# Patient Record
Sex: MALE | Race: WHITE | NOT HISPANIC OR LATINO | Employment: FULL TIME | URBAN - METROPOLITAN AREA
[De-identification: names, ages, dates, MRNs, and addresses within clinical notes are randomized per-mention and may not be internally consistent; named-entity substitution may affect disease eponyms.]

---

## 2022-08-31 ENCOUNTER — RA CDI HCC (OUTPATIENT)
Dept: OTHER | Facility: HOSPITAL | Age: 27
End: 2022-08-31

## 2022-08-31 NOTE — PROGRESS NOTES
[facility]  coding opportunities       Chart reviewed, no opportunity found: CHART REVIEWED, NO OPPORTUNITY FOUND        Patients Insurance        Commercial Insurance: [address] [place] [address]

## 2022-09-06 NOTE — PROGRESS NOTES
ADULT ANNUAL [address] IN PARTNERSHIP WITH [facility]'S    NAME: Rk Hanley  AGE: 32 y o  SEX: male  : 1995     DATE: 2022     Assessment and Plan:     - baseline labs ordered to complete fasting    - mental health is primary concern today  Patient has a past medical history of anxiety and depression, and the patient also believes he has underlying ADHD that has been present since childhood  Screening tools are suggestive of this diagnosis but it is not confirmed yet  Will address further at follow-up appointment in 3 weeks  Clinical pharmacy team consulted, would appreciate their professional opinion in addition to possible treatment options based on the patient's condition    - patient advised to slowly decrease caffeine intake which is likely contributing to his mental health symptoms  Also encouraged the patient to continue his outpatient virtual therapy sessions    - Discuss treatment options at f/u (stimulants vs SSRIs)    Problem List Items Addressed This Visit        Other    Overweight (BMI 25 0-29  9)    Relevant Orders    CBC and differential    Comprehensive metabolic panel    Lipid panel    Vitamin D 25 hydroxy    TSH, 3rd generation with Free T4 reflex    Anxiety and depression     First noticed issues back in , was coping with alcohol, no longer consuming in excess  Family hx of alcohol abuse  Currently doing telehealth therapy, has concerns he may have not been dx with ADHD as a child  Primary issue is with "focus and tasks" and "mind going in all directions"  Noticed problems in early adolescents  Positive ASRS-v 1 checklist with therapy team (adhd screen)  Brief version performed today which was positive  Currently consuming approx 600mg of caffeine daily (energy drinks)  Is concerned this is contributing  Was on an anti-anxiety medication in past, no longer taking   Thinks it was a benzodiazepine  States "it was a bandaid treatment"  Relevant Orders    Vitamin D 25 hydroxy    TSH, 3rd generation with Free T4 reflex    Ambulatory referral to clinical pharmacy      Other Visit Diagnoses     Annual physical exam    -  Primary    Relevant Orders    CBC and differential    Comprehensive metabolic panel    Lipid panel    Need for hepatitis C screening test        Relevant Orders    Hepatitis C Ab W/Refl To HCV RNA, Qn, PCR    Screening for HIV (human immunodeficiency virus)        Relevant Orders    Human Immunodeficiency Virus 1/2 Antigen / Antibody ( Fourth Generation) with Reflex Testing          Immunizations and preventive care screenings were discussed with patient today  Appropriate education was printed on patient's after visit summary  Counseling:  Alcohol/drug use: discussed moderation in alcohol intake, the recommendations for healthy alcohol use, and avoidance of illicit drug use  BMI Counseling: Body mass index is 27 76 kg/m²  The BMI is above normal  Nutrition recommendations include limiting drinks that contain sugar  Exercise recommendations include exercising 3-5 times per week  Rationale for BMI follow-up plan is due to patient being overweight or obese  Return in about 3 weeks (around 9/29/2022) for Recheck  Chief Complaint:     Chief Complaint   Patient presents with    Physical Exam    ADHD     Impulsive behavior, losing focus in conversation       History of Present Illness:     Adult Annual Physical   Patient here for a comprehensive physical exam  The patient reports problems - anxiety/depression  Diet and Physical Activity  Diet/Nutrition: high protein  High caffeine intake  Exercise: 5-7 times a week on average         Depression Screening  PHQ-2/9 Depression Screening    Little interest or pleasure in doing things: 1 - several days  Feeling down, depressed, or hopeless: 1 - several days  PHQ-2 Score: 2  PHQ-2 Interpretation: Negative depression screen       General Health  Sleep: gets 4-6 hours of sleep on average  Hearing: normal - bilateral   Vision: wears glasses  Dental: regular dental visits   Health  History of STDs?: no      Review of Systems:     Review of Systems   Constitutional: Negative  HENT: Negative  Eyes: Negative  Respiratory: Negative  Cardiovascular: Negative  Gastrointestinal: Negative  Endocrine: Negative  Genitourinary: Negative  Musculoskeletal: Negative  Skin: Negative  Allergic/Immunologic: Negative  Neurological: Negative  Hematological: Negative  Psychiatric/Behavioral: The patient is nervous/anxious and is hyperactive  Past Medical History:     Past Medical History:   Diagnosis Date    Allergic     Anxiety 2016    Depression 2016      Past Surgical History:     History reviewed  No pertinent surgical history  Social History:     Social History     Socioeconomic History    Marital status: Single     Spouse name: None    Number of children: None    Years of education: None    Highest education level: None   Occupational History    None   Tobacco Use    Smoking status: Passive Smoke Exposure - Never Smoker    Smokeless tobacco: Never Used   Substance and Sexual Activity    Alcohol use:  Yes     Alcohol/week: 8 0 standard drinks     Types: 8 Cans of beer per week    Drug use: Never    Sexual activity: Not Currently     Partners: Female     Birth control/protection: Condom Male   Other Topics Concern    None   Social History Narrative    None     Social Determinants of Health     Financial Resource Strain: Not on file   Food Insecurity: Not on file   Transportation Needs: Not on file   Physical Activity: Not on file   Stress: Not on file   Social Connections: Not on file   Intimate Partner Violence: Not on file   Housing Stability: Not on file      Family History:     Family History   Problem Relation Age of Onset    Depression Mother    Angy Wilkinson Vision loss Mother     Alcohol abuse Father     Prostate cancer Father     Hearing loss Father     Alcohol abuse Paternal Uncle     Depression Paternal Uncle     Mental illness Cousin         Death by Suicide    Completed Suicide  Cousin     Depression Maternal Aunt       Current Medications:     No current outpatient medications on file  No current facility-administered medications for this visit  Allergies: Allergies   Allergen Reactions    Nuts - Food Allergy Anaphylaxis and Hives      Physical Exam:     /72 (BP Location: Left arm, Patient Position: Sitting, Cuff Size: Large)   Pulse 80   Temp 97 8 °F (36 6 °C)   Ht 5' 6" (1 676 m)   Wt 78 kg (172 lb)   SpO2 98%   BMI 27 76 kg/m²     Physical Exam  Vitals and nursing note reviewed  Constitutional:       Appearance: Normal appearance  He is well-developed  HENT:      Head: Normocephalic and atraumatic  Right Ear: Tympanic membrane normal  There is no impacted cerumen  Left Ear: Tympanic membrane normal  There is no impacted cerumen  Nose: Nose normal  No congestion  Mouth/Throat:      Mouth: Mucous membranes are moist       Pharynx: Oropharynx is clear  No oropharyngeal exudate or posterior oropharyngeal erythema  Eyes:      Extraocular Movements: Extraocular movements intact  Conjunctiva/sclera: Conjunctivae normal       Pupils: Pupils are equal, round, and reactive to light  Cardiovascular:      Rate and Rhythm: Normal rate and regular rhythm  Pulses: Normal pulses  Heart sounds: Normal heart sounds  No murmur heard  Pulmonary:      Effort: Pulmonary effort is normal  No respiratory distress  Breath sounds: Normal breath sounds  No wheezing  Abdominal:      General: Bowel sounds are normal       Palpations: Abdomen is soft  Tenderness: There is no abdominal tenderness  Musculoskeletal:         General: No swelling, tenderness, deformity or signs of injury   Normal range of motion  Cervical back: Normal range of motion and neck supple  Right lower leg: No edema  Left lower leg: No edema  Lymphadenopathy:      Cervical: No cervical adenopathy  Skin:     General: Skin is warm and dry  Capillary Refill: Capillary refill takes less than 2 seconds  Findings: No rash  Neurological:      General: No focal deficit present  Mental Status: He is alert and oriented to person, place, and time  Mental status is at baseline  Cranial Nerves: No cranial nerve deficit  Sensory: No sensory deficit  Motor: No weakness        Coordination: Coordination normal       Gait: Gait normal       Deep Tendon Reflexes: Reflexes normal    Psychiatric:         Mood and Affect: Mood normal          Behavior: Behavior normal           Marylin Lucero

## 2022-09-06 NOTE — PATIENT INSTRUCTIONS
Try to decrease caffeine intake  Complete bloodwork fasting here or at [facility]  Continue virtual therapy sessions  Pharmacy team will reach out to schedule a visit  Wellness Visit for Adults   AMBULATORY CARE:   A wellness visit  is when you see your healthcare provider to get screened for health problems  Your healthcare provider will also give you advice on how to stay healthy  Write down your questions so you remember to ask them  Ask your healthcare provider how often you should have a wellness visit  What happens at a wellness visit:  Your healthcare provider will ask about your health, and your family history of health problems  This includes high blood pressure, heart disease, and cancer  He or she will ask if you have symptoms that concern you, if you smoke, and about your mood  You may also be asked about your intake of medicines, supplements, food, and alcohol  Any of the following may be done: Your weight  will be checked  Your height may also be checked so your body mass index (BMI) can be calculated  Your BMI shows if you are at a healthy weight  Your blood pressure  and heart rate will be checked  Your temperature may also be checked  Blood and urine tests  may be done  Blood tests may be done to check your cholesterol levels  Abnormal cholesterol levels increase your risk for heart disease and stroke  You may also need a blood or urine test to check for diabetes if you are at increased risk  Urine tests may be done to look for signs of an infection or kidney disease  A physical exam  includes checking your heartbeat and lungs with a stethoscope  Your healthcare provider may also check your skin to look for sun damage  Screening tests  may be recommended  A screening test is done to check for diseases that may not cause symptoms  The screening tests you may need depend on your age, gender, family history, and lifestyle habits   For example, colorectal screening may be recommended if you are 48years old or older  Screening tests you need if you are a woman:   A Pap smear  is used to screen for cervical cancer  Pap smears are usually done every 3 to 5 years depending on your age  You may need them more often if you have had abnormal Pap smear test results in the past  Ask your healthcare provider how often you should have a Pap smear  A mammogram  is an x-ray of your breasts to screen for breast cancer  Experts recommend mammograms every 2 years starting at age 48 years  You may need a mammogram at age 52 years or younger if you have an increased risk for breast cancer  Talk to your healthcare provider about when you should start having mammograms and how often you need them  Vaccines you may need:   Get an influenza vaccine  every year  The influenza vaccine protects you from the flu  Several types of viruses cause the flu  The viruses change over time, so new vaccines are made each year  Get a tetanus-diphtheria (Td) booster vaccine  every 10 years  This vaccine protects you against tetanus and diphtheria  Tetanus is a severe infection that may cause painful muscle spasms and lockjaw  Diphtheria is a severe bacterial infection that causes a thick covering in the back of your mouth and throat  Get a human papillomavirus (HPV) vaccine  if you are female and aged 23 to 32 or male 23 to 24 and never received it  This vaccine protects you from HPV infection  HPV is the most common infection spread by sexual contact  HPV may also cause vaginal, penile, and anal cancers  Get a pneumococcal vaccine  if you are aged 72 years or older  The pneumococcal vaccine is an injection given to protect you from pneumococcal disease  Pneumococcal disease is an infection caused by pneumococcal bacteria  The infection may cause pneumonia, meningitis, or an ear infection  Get a shingles vaccine  if you are 60 or older, even if you have had shingles before   The shingles vaccine is an injection to protect you from the varicella-zoster virus  This is the same virus that causes chickenpox  Shingles is a painful rash that develops in people who had chickenpox or have been exposed to the virus  How to eat healthy:  My Plate is a model for planning healthy meals  It shows the types and amounts of foods that should go on your plate  Fruits and vegetables make up about half of your plate, and grains and protein make up the other half  A serving of dairy is included on the side of your plate  The amount of calories and serving sizes you need depends on your age, gender, weight, and height  Examples of healthy foods are listed below:  Eat a variety of vegetables  such as dark green, red, and orange vegetables  You can also include canned vegetables low in sodium (salt) and frozen vegetables without added butter or sauces  Eat a variety of fresh fruits , canned fruit in 100% juice, frozen fruit, and dried fruit  Include whole grains  At least half of the grains you eat should be whole grains  Examples include whole-wheat bread, wheat pasta, brown rice, and whole-grain cereals such as oatmeal     Eat a variety of protein foods such as seafood (fish and shellfish), lean meat, and poultry without skin (turkey and chicken)  Examples of lean meats include pork leg, shoulder, or tenderloin, and beef round, sirloin, tenderloin, and extra lean ground beef  Other protein foods include eggs and egg substitutes, beans, peas, soy products, nuts, and seeds  Choose low-fat dairy products such as skim or 1% milk or low-fat yogurt, cheese, and cottage cheese  Limit unhealthy fats  such as butter, hard margarine, and shortening  Exercise:  Exercise at least 30 minutes per day on most days of the week  Some examples of exercise include walking, biking, dancing, and swimming  You can also fit in more physical activity by taking the stairs instead of the elevator or parking farther away from stores  Include muscle strengthening activities 2 days each week  Regular exercise provides many health benefits  It helps you manage your weight, and decreases your risk for type 2 diabetes, heart disease, stroke, and high blood pressure  Exercise can also help improve your mood  Ask your healthcare provider about the best exercise plan for you  General health and safety guidelines:   Do not smoke  Nicotine and other chemicals in cigarettes and cigars can cause lung damage  Ask your healthcare provider for information if you currently smoke and need help to quit  E-cigarettes or smokeless tobacco still contain nicotine  Talk to your healthcare provider before you use these products  Limit alcohol  A drink of alcohol is 12 ounces of beer, 5 ounces of wine, or 1½ ounces of liquor  Lose weight, if needed  Being overweight increases your risk of certain health conditions  These include heart disease, high blood pressure, type 2 diabetes, and certain types of cancer  Protect your skin  Do not sunbathe or use tanning beds  Use sunscreen with a SPF 15 or higher  Apply sunscreen at least 15 minutes before you go outside  Reapply sunscreen every 2 hours  Wear protective clothing, hats, and sunglasses when you are outside  Drive safely  Always wear your seatbelt  Make sure everyone in your car wears a seatbelt  A seatbelt can save your life if you are in an accident  Do not use your cell phone when you are driving  This could distract you and cause an accident  Pull over if you need to make a call or send a text message  Practice safe sex  Use latex condoms if are sexually active and have more than one partner  Your healthcare provider may recommend screening tests for sexually transmitted infections (STIs)  Wear helmets, lifejackets, and protective gear  Always wear a helmet when you ride a bike or motorcycle, go skiing, or play sports that could cause a head injury   Wear protective equipment when you play sports  Wear a lifejacket when you are on a boat or doing water sports  © Copyright [address] 2022 Information is for End User's use only and may not be sold, redistributed or otherwise used for commercial purposes  All illustrations and images included in CareNotes® are the copyrighted property of [employer]  or [facility] Jonnathan Figueroa   The above information is an  only  It is not intended as medical advice for individual conditions or treatments  Talk to your doctor, nurse or pharmacist before following any medical regimen to see if it is safe and effective for you

## 2022-09-08 ENCOUNTER — OFFICE VISIT (OUTPATIENT)
Dept: FAMILY MEDICINE CLINIC | Facility: CLINIC | Age: 27
End: 2022-09-08
Payer: COMMERCIAL

## 2022-09-08 VITALS
HEART RATE: 80 BPM | HEIGHT: 66 IN | OXYGEN SATURATION: 98 % | TEMPERATURE: 97.8 F | BODY MASS INDEX: 27.64 KG/M2 | WEIGHT: 172 LBS | SYSTOLIC BLOOD PRESSURE: 120 MMHG | DIASTOLIC BLOOD PRESSURE: 72 MMHG

## 2022-09-08 DIAGNOSIS — Z00.00 ANNUAL PHYSICAL EXAM: Primary | ICD-10-CM

## 2022-09-08 DIAGNOSIS — Z11.4 SCREENING FOR HIV (HUMAN IMMUNODEFICIENCY VIRUS): ICD-10-CM

## 2022-09-08 DIAGNOSIS — F41.9 ANXIETY AND DEPRESSION: ICD-10-CM

## 2022-09-08 DIAGNOSIS — E66.3 OVERWEIGHT (BMI 25.0-29.9): ICD-10-CM

## 2022-09-08 DIAGNOSIS — F32.A ANXIETY AND DEPRESSION: ICD-10-CM

## 2022-09-08 DIAGNOSIS — Z11.59 NEED FOR HEPATITIS C SCREENING TEST: ICD-10-CM

## 2022-09-08 PROCEDURE — 3725F SCREEN DEPRESSION PERFORMED: CPT | Performed by: NURSE PRACTITIONER

## 2022-09-08 PROCEDURE — 99385 PREV VISIT NEW AGE 18-39: CPT | Performed by: NURSE PRACTITIONER

## 2022-09-08 NOTE — ASSESSMENT & PLAN NOTE
First noticed issues back in 2016, was coping with alcohol, no longer consuming in excess  Family hx of alcohol abuse  Currently doing telehealth therapy, has concerns he may have not been dx with ADHD as a child  Primary issue is with "focus and tasks" and "mind going in all directions"  Noticed problems in early adolescents  Positive ASRS-v 1 checklist with therapy team (adhd screen)  Brief version performed today which was positive  Currently consuming approx 600mg of caffeine daily (energy drinks)  Is concerned this is contributing  Was on an anti-anxiety medication in past, no longer taking  Thinks it was a benzodiazepine  States "it was a bandaid treatment"

## 2022-09-15 ENCOUNTER — CLINICAL SUPPORT (OUTPATIENT)
Dept: FAMILY MEDICINE CLINIC | Facility: CLINIC | Age: 27
End: 2022-09-15

## 2022-09-15 DIAGNOSIS — F32.A ANXIETY AND DEPRESSION: ICD-10-CM

## 2022-09-15 DIAGNOSIS — F41.9 ANXIETY AND DEPRESSION: ICD-10-CM

## 2022-09-15 NOTE — PROGRESS NOTES
[address]    Moi Brand is a 32 y o  male who was referred to the pharmacist for medication education and management, referred by JOAN Encinas   Telemedicine consent  The patient was identified by name and date of birth  Moi Brand was informed that this is a telemedicine visit and that the visit is being conducted through Telephone  My office door was closed  No one else was in the room  He acknowledged consent and understanding of privacy and security of the video platform  The patient has agreed to participate and understands they can discontinue the visit at any time  Assessment/ Plan       1  Mental Health  · Concern for underlying ADHD without confirmed diagnosis plus depression/anxiety  · First line- consider non-stimulant type medication such as bupropion  Starting dose for ADHD is bupropion  mg daily in AM  After 3-4 weeks increase to therapeutic dose of bupropion  mg daily (max dose 450 mg)  · Bupropion should be tried for ~3-4 weeks at max tolerated therapeutic dose  · If bupropion not effective then may consider stimulant type medication  Discussed with patient that he would need to severely cut down on caffeine consumption before starting (additive effects of increased HR and BP)  Also discussed that if on stimulant medication he would need to avoid consumption of alcohol and limit to no more than 1-2 drinks per day  Changes to Medication Regimen:    No changes today  Patient will get updated blood work prior to next appt with PCP    2  Medication Reconciliation:    Not currently on prescription or OTC medication     Labs ordered: reminded patient to get prior to next appt with PCP    Follow-up: PCP 9/29/22    Subjective     1   Medication Adherence/ Tolerability:   Benzo for anxiety in past  Patient states it was "off-brand xanax" so likely alprazolam  Patient states it was more of a "bandaid"  Has not been on any other maintenance medications for depression / anxiety     2  History of mental health  · Patient recalls when he was 13 yo that was having focus issues that he brought it up with his parents  They said to make them aware if it was affecting his school work and he did not bring up again  Then pre-pandmic he once again began to notice issues with focusing and patterns of thinking that were scattered  · More recently he started going to therapy to discuss this with his therapist  Also states he had difficulties earlier with increased alcohol consumption, but this was part of a larger problem with impulse control  He also feels her is currently overstimulating himself with caffeine  Since therapy he has implemented behavioral changes and developed coping mechanisms  He is now focusing on lifestyle changes such as improved diet, eating more protein, and increasing exercise     2  Lifestyle/ social hx:   o Caffeine intake: Estimates that he was drinking about 600 mg of caffeine daily in the form of energy drinks(Bang 16 oz (300 mg caffeine) or Reign energy drink)  Previously he was drinking 2 cans of Bang most days (600 mg total)  He decreased it down to 1 can of Bang most days this past week (300 mg total)  Plans to continue to decrease consumption over the next few weeks  o Alcohol use: Reports that 2016 through 2019 he was drinking "way too much"  He did cut back on alcohol from 2019- 2020  Then in 2021 through 2022 he was attempting sobriety and was mostly successful  In 2022 reports he is no longer completely sober but still in control of his drinking  Admits to using it as a coping mechanism  Reports current consumption is 4-6 drinks in a day and this happens about 1x week     Physical Activity: Exercises 4-5 session in the gym ~1 hour resistance training    Sleep: attempting to get 8 hours per night        Objective       No current outpatient medications  Allergies   Allergen Reactions    Nuts - Food Allergy Anaphylaxis and Hives       Immunization History   Administered Date(s) Administered    Hep A, ped/adol, 2 dose 04/01/2009, 05/10/2013    Influenza, seasonal, injectable 10/10/2002, 09/29/2003, 11/02/2005    Meningococcal MCV4P 06/26/2007, 05/17/2011    Td (adult), adsorbed 06/10/2005    Tdap 04/01/2009    Tuberculin Skin Test-PPD Intradermal 05/11/2011, 07/07/2020       Vitals: There were no vitals filed for this visit      Labs:    No results found for: SODIUM, K, EGFR, CREATININE, GLUF, [id number], [facility] DR STEFANO MICHAELS  Reason For Outreach: Embedded Pharmacist  Demographics:  Intervention Method: Phone  Type of Intervention: New  Topics Addressed: Psychiatric disorders  Pharmacologic Interventions: Medication Initiation  Non-Pharmacologic Interventions: Disease state education and Medication/Device education  Time:  Direct Patient Care: 60 mins  Care Coordination: 30 mins  Recommendation Recipient: Patient/Caregiver and Provider  Outcome: Pending/ Follow-up Required

## 2022-09-16 ENCOUNTER — PATIENT MESSAGE (OUTPATIENT)
Dept: FAMILY MEDICINE CLINIC | Facility: CLINIC | Age: 27
End: 2022-09-16

## 2022-09-16 NOTE — PATIENT INSTRUCTIONS
ADHD in Adults   AMBULATORY CARE:   Attention deficit hyperactivity disorder (ADHD)  is a condition that affects behavior  You may be overactive and have a short attention span  ADHD interferes with how you function in your daily activities at work, school, or home  ADHD may also cause you to have problems getting along with other people  Signs and symptoms of ADHD:  ADHD has 2 main types, based on signs and symptoms  You may have a combination of the 2 main types  A combination is the most common type of ADHD  Inattention:      Get easily distracted or have a hard time focusing    Avoid tasks that need full attention    Not follow or easily forget instructions or directions    Not listen, or drift away when spoken to    Make careless mistakes or lose things    Have problems organizing tasks or chores and managing time    Hyperactivity and impulsivity:      Become easily bored and not finish tasks    Talk a lot, interrupt, or intrude into conversations or games    Change schools or jobs often    Feel stressed, nervous, or worried much of the time    Have problems doing quiet activities or sitting still    Have an addictive behavior such as use of alcohol or illegal drugs, shopping, eating, or working too much    Have more energy than seems normal    Call your local emergency number (911 in the [address]) if:   You feel like hurting yourself or someone else  Seek care immediately if:   You have a seizure  You have trouble breathing, chest pains, or a fast heartbeat  Call your doctor if:   You feel you cannot cope at home, work, or school  You have new symptoms since the last time you visited your healthcare provider  Your symptoms are getting worse  You have questions or concerns about your condition or care  Treatment for ADHD:  The goal of treatment is to help you learn how to control your behavior  A combination of therapy and medication is usually most effective for treating ADHD   You may need any of the following:  Behavior therapy  is used to help you learn to control your actions and improve your behavior  This is done by teaching you how to change your behavior by looking at the results of your actions  Psychotherapy  is also called talk therapy  You may have one-on-one visits with a therapist or with others in a group setting  Stimulants  help you pay attention, concentrate better, and manage your energy  Antidepressants  help decrease or prevent depression or anxiety  It can also be used to treat other behavior problems  Manage ADHD:   Reduce stress  Stress may make your ADHD worse  Ask about ways to calm your body and mind  These may include deep breathing, muscle relaxation, music, and biofeedback  Talk to someone about things that upset you  Learn more about ADHD  The more you know about ADHD, the better you will be able to help yourself  Read books, work with your therapist, and find the support of other people with ADHD  Do not drink alcohol  Alcohol may make your symptoms worse  Create a regular sleep schedule  Sleep can help decrease your symptoms  Try to go to bed and wake up at the same times each day  Do not watch TV, use the computer, or play video games before bed  Electronic devices can make it hard for you to sleep or to stay asleep  Eat a variety of healthy foods  Healthy foods can help increase your concentration and make you feel calmer  Healthy foods include fruits, vegetables, low-fat dairy products, lean meats, fish, whole-grain breads, and cooked beans  Ask your healthcare provider if you need to be on a special diet  Follow up with your doctor as directed:  Write down your questions so you remember to ask them during your visits  © Copyright [employer] 2022 Information is for End User's use only and may not be sold, redistributed or otherwise used for commercial purposes   All illustrations and images included in CareNotes® are the copyrighted property of [employer]  or [address]   The above information is an  only  It is not intended as medical advice for individual conditions or treatments  Talk to your doctor, nurse or pharmacist before following any medical regimen to see if it is safe and effective for you  Bupropion (By mouth)   Bupropion ([id number])  Treats depression and aids in quitting smoking  Also prevents depression caused by seasonal affective disorder (SAD)  Brand Name(s): Aplenzin, Forfivo XL, Wellbutrin, Wellbutrin SR, Wellbutrin XL   There may be other brand names for this medicine  When This Medicine Should Not Be Used: This medicine is not right for everyone  Do not use it if you had an allergic reaction to bupropion, or if you have seizures, anorexia, or bulimia  How to Use This Medicine:   Tablet, Long Acting Tablet  Take your medicine as directed  Your dose may need to be changed several times to find what works best for you  You may need to take Wellbutrin® for up to 4 weeks before you feel better  You may need to take Zyban® for 1 to 2 weeks before the date that you plan to stop smoking  Swallow the extended-release tablet whole  Do not crush, break, or chew it  It is best to take Aplenzin® in the morning  Do not take Wellbutrin® or Zyban® close to bedtime if you have trouble sleeping  Take it with food if it upsets your stomach or if you have nausea  If you take the extended-release tablet, part of the tablet may pass into your stools  This is normal and is nothing to worry about  This medicine should come with a Medication Guide  Ask your pharmacist for a copy if you do not have one  Missed dose: Skip the missed dose and go back to your regular dosing schedule  Never take extra medicine to make up for a missed dose  Store the medicine in a closed container at room temperature, away from heat, moisture, and direct light    Drugs and Foods to Avoid:   Ask your doctor or pharmacist before using any other medicine, including over-the-counter medicines, vitamins, and herbal products  Do not use this medicine and an MAO inhibitor (MAOI) within 14 days of each other  Do not use Zyban® to quit smoking if you already take Aplenzin® or Wellbutrin® for depression, because they are the same medicine  Tell your doctor if you take barbiturates, benzodiazepines, antiseizure medicine, or sedatives, or if you recently stopped taking them  Some medicines can affect how bupropion works  Tell your doctor if you use any of the following:   Amantadine, carbamazepine, cimetidine, clopidogrel, cyclophosphamide, digoxin, efavirenz, levodopa, lopinavir, nelfinavir, nicotine patch, orphenadrine, phenobarbital, phenytoin, ritonavir, tamoxifen, theophylline, thiotepa, ticlopidine  Beta blocker medicine (including metoprolol)  A blood thinner (including warfarin)  Insulin or diabetes medicine  Medicine to treat depression (including desipramine, fluoxetine, imipramine, nortriptyline, paroxetine, sertraline, venlafaxine)  Medicine to treat heart rhythm problems (including flecainide, propafenone)  Medicine to treat mental illness (including haloperidol, risperidone, thioridazine)  Steroid medicine (including dexamethasone, hydrocortisone, methylprednisolone, prednisolone, prednisone)  Do not drink alcohol while you are using this medicine  Tell your doctor if you use anything else that makes you sleepy  Some examples are allergy medicine, narcotic pain medicine, and alcohol  Warnings While Using This Medicine:   Tell your doctor if you are pregnant or breastfeeding, or if you have kidney disease, liver disease, heart disease, diabetes, glaucoma, mental illness (including bipolar disorder), or high blood pressure  Tell your doctor if you have a history of drug addiction or if you drink alcohol  For some children, teenagers, and young adults, this medicine may increase mental or emotional problems   This may lead to thoughts of suicide and violence  Talk with your doctor right away if you have any thoughts or behavior changes that concern you  Tell your doctor if you or anyone in your family has a history of bipolar disorder or suicide attempts  This medicine may cause the following problems:  Increased risk of seizures  Changes in mood or behavior  High blood pressure  Serious skin reactions  This medicine may make you dizzy or drowsy  Do not drive or do anything that could be dangerous until you know how this medicine affects you  Zyban® is only part of a complete program to help you quit smoking  You may still want to smoke at times  Have a plan to cope with these situations  Do not stop using this medicine suddenly  Your doctor will need to slowly decrease your dose before you stop it completely  Tell any doctor or dentist who treats you that you are using this medicine  This medicine may affect certain medical test results  Your doctor will check your progress and the effects of this medicine at regular visits  Keep all appointments  Keep all medicine out of the reach of children  Never share your medicine with anyone  Possible Side Effects While Using This Medicine:   Call your doctor right away if you notice any of these side effects:   Allergic reaction: Itching or hives, swelling in your face or hands, swelling or tingling in your mouth or throat, chest tightness, trouble breathing  Blistering, peeling, red skin rash  Chest pain, trouble breathing, fast, slow, or uneven heartbeat  Eye pain, vision changes, seeing halos around lights  Muscle or joint pain, fever with rash  Seeing or hearing things that are not there, feeling like people are against you  Seizures  Sudden increase in energy, racing thoughts, trouble sleeping  Thoughts of hurting yourself, worsening depression, severe agitation or confusion  If you notice these less serious side effects, talk with your doctor:   Dry mouth  Headache, dizziness  Nausea, vomiting, constipation, diarrhea, gas, stomach pain  Weight gain or loss  If you notice other side effects that you think are caused by this medicine, tell your doctor  Call your doctor for medical advice about side effects  You may report side effects to FDA at 1-146-YAK-7852    © Copyright [employer] 2022 Information is for End User's use only and may not be sold, redistributed or otherwise used for commercial purposes  The above information is an  only  It is not intended as medical advice for individual conditions or treatments  Talk to your doctor, nurse or pharmacist before following any medical regimen to see if it is safe and effective for you  Atomoxetine (By mouth)   Atomoxetine ([id number])  Treats attention deficit hyperactivity disorder (ADHD)  Brand Name(s): Strattera   There may be other brand names for this medicine  When This Medicine Should Not Be Used: This medicine is not right for everyone  Do not use it if you had an allergic reaction to atomoxetine, or if you have narrow-angle glaucoma, severe heart disease, or pheochromocytoma  How to Use This Medicine:   Capsule  Take your medicine as directed  Your dose may need to be changed several times to find what works best for you  This medicine should come with a Medication Guide  Ask your pharmacist for a copy if you do not have one  Swallow the capsule whole  Do not crush, break, chew, or open it  Do not touch a broken or opened capsule  Wash your hands with water if you touch an opened capsule  If this medicine gets in your eyes, rinse them with water and call your doctor right away  Missed dose: Take a dose as soon as you remember  If it is almost time for your next dose, wait until then and take a regular dose  Do not take extra medicine to make up for a missed dose  Store the medicine in a closed container at room temperature, away from heat, moisture, and direct light    Drugs and Foods to Avoid:   Ask your doctor or pharmacist before using any other medicine, including over-the-counter medicines, vitamins, and herbal products  Do not use this medicine if you are using or have used MAO inhibitor (MAOI) within the past 14 days  Some medicines can affect how atomoxetine works  Tell your doctor if you are taking albuterol, dopamine, dobutamine, midazolam, quinidine, or medicine to treat depression (including fluoxetine, paroxetine)  Warnings While Using This Medicine:   Tell your doctor if you are pregnant or breastfeeding, or if you have kidney disease, liver disease, heart or blood vessel disease, heart rhythm problems, high or low blood pressure, or problems with urination  Tell your doctor if you or anyone in your family has a history of depression, bipolar disorder, suicide, or mental illness  For some children, teenagers, and young adults, this medicine may increase mental or emotional problems  This may lead to thoughts of suicide and violence  Talk with your doctor right away if you have any thoughts or behavior changes that concern you  Tell your doctor if you or anyone in your family has a history of bipolar disorder or suicide attempts  This medicine may cause the following:   Liver problems  Heart or blood vessel problems  Blood pressure changes  Painful or prolonged erection of the penis  Slowed growth in children  This medicine may make you dizzy or drowsy  Do not drive or doing anything that could be dangerous until you know how this medicine affects you  Your doctor will do lab tests at regular visits to check on the effects of this medicine  Keep all appointments  Keep all medicine out of the reach of children  Never share your medicine with anyone  Possible Side Effects While Using This Medicine:   Call your doctor right away if you notice any of these side effects:   Allergic reaction: Itching or hives, swelling in your face or hands, swelling or tingling in your mouth or throat, chest tightness, trouble breathing  Change in how much or how often you urinate  Chest pain that may spread, trouble breathing, coughing up blood, unusual sweating  Dark urine or pale stools, nausea, vomiting, loss of appetite, stomach pain, yellow skin or eyes  Extreme energy, mood or mental changes, confusion, anxiety, agitation, restlessness, trouble sleeping, unusual thoughts or behavior  Fast, slow, pounding, or uneven heartbeat  Lightheadedness, dizziness, fainting  Numbness or weakness in your arm or leg, or on one side of your body, pain in your lower leg  Painful, prolonged erection of the penis  Rapid weight gain, swelling in your hands, ankles, or feet  Slow growth in children  Sudden or severe headache, problems with vision, speech, or walking  If you notice these less serious side effects, talk with your doctor:   Dry mouth, constipation  Loss of interest in sex, trouble having sex  Unusual drowsiness, tiredness  If you notice other side effects that you think are caused by this medicine, tell your doctor  Call your doctor for medical advice about side effects  You may report side effects to FDA at 5-575-FDA-7944    © Copyright [employer] 2022 Information is for End User's use only and may not be sold, redistributed or otherwise used for commercial purposes  The above information is an  only  It is not intended as medical advice for individual conditions or treatments  Talk to your doctor, nurse or pharmacist before following any medical regimen to see if it is safe and effective for you  Amphetamine/Dextroamphetamine (By mouth)   Treats ADHD  Also treats narcolepsy  Brand Name(s): Adderall, Adderall XR, Mydayis   There may be other brand names for this medicine  When This Medicine Should Not Be Used: This medicine is not right for everyone   Do not use it if you had an allergic reaction to amphetamine, dextroamphetamine, or similar medicines, or if you have glaucoma, an overactive thyroid, or a history of drug abuse  How to Use This Medicine:   Long Acting Capsule, Tablet  Take your medicine as directed  Your dose may need to be changed several times to find what works best for you  Extended-release capsule: Take the capsule in the morning right after you wake up  You may have trouble falling asleep at night if you take it in the afternoon or evening  Swallow the capsule whole  Do not crush, break, or chew it  If you cannot swallow the capsule, you may open it and sprinkle the contents over a spoonful of applesauce  Swallow the mixture right away without chewing  You may take the capsule with or without food, but make sure to take it the same way each time  Tablet: Take the tablet in the morning and early afternoon  You may have trouble falling asleep if you take it at night  This medicine should come with a Medication Guide  Ask your pharmacist for a copy if you do not have one  Missed dose: Take a dose as soon as you remember  If it is almost time for your next dose, wait until then and take a regular dose  Do not take extra medicine to make up for a missed dose  Store the medicine in a closed container at room temperature, away from heat, moisture, and direct light  Drugs and Foods to Avoid:   Ask your doctor or pharmacist before using any other medicine, including over-the-counter medicines, vitamins, and herbal products  Do not use this medicine if you are using or you have used an MAO inhibitor (MAOI) within the past 14 days  Some foods and medicines can affect how this medicine works   Tell your doctor if you are using any of the following:   Acetazolamide, ammonium chloride, buspirone, chlorpromazine, ethosuximide, fentanyl, glutamic acid, guanethidine, haloperidol, hydrochlorothiazide, lithium, meperidine, methenamine, phenobarbital, phenytoin, propoxyphene, quinidine, reserpine, ritonavir, sodium acid phosphate, Amilcar's wort, tramadol  Allergy medicine  Antacid or other stomach medicine (including cimetidine, esomeprazole, omeprazole, pantoprazole, sodium bicarbonate)  Blood pressure medicine  Medicine to treat depression (including desipramine, fluoxetine, paroxetine, protriptyline)  Triptan medicine to treat migraine headache  Tryptophan supplement  Fruit juice and vitamin C can affect how your body absorbs this medicine  Do not drink alcohol while you are using this medicine  Warnings While Using This Medicine:   Tell your doctor if you are pregnant or breastfeeding, or if you have heart or blood vessel disease (including arteriosclerosis), kidney disease, heart rhythm problems, high blood pressure, or a history of heart attack, stroke, seizures, or Tourette syndrome  Tell your doctor if you or anyone in your family has a history of depression, mental health problems, or drug or alcohol addiction  This medicine may cause the following problems:   Heart or blood vessel problems, including heart attack and stroke  Unusual changes in behavior or thoughts  Peripheral vasculopathy (blood circulation problem)  Slow growth in children  Increased risk for seizures  Serotonin syndrome (when used with certain medicines)  This medicine can be habit-forming  Do not use more than your prescribed dose  Call your doctor if you think your medicine is not working  This medicine may make you dizzy or cause blurred vision  Do not drive or do anything else that could be dangerous until you know how this medicine affects you  Tell any doctor or dentist who treats you that you are using this medicine  This medicine may affect certain medical test results  Your doctor will do lab tests at regular visits to check on the effects of this medicine  Keep all appointments  Keep all medicine out of the reach of children  Never share your medicine with anyone    Possible Side Effects While Using This Medicine:   Call your doctor right away if you notice any of these side effects: Allergic reaction: Itching or hives, swelling in your face or hands, swelling or tingling in your mouth or throat, chest tightness, trouble breathing  Anxiety, fever, sweating, muscle spasms, twitching, nausea, vomiting, diarrhea, seeing or hearing things that are not there  Blurred vision or vision changes  Chest pain, trouble breathing, fainting  Extreme energy or restlessness, confusion, agitation, unusual mood or behavior  Fast, slow, pounding, or uneven heartbeat  Numb, cold, pale, or painful fingers or toes, unexplained wounds on the fingers or toes  Seizures  If you notice these less serious side effects, talk with your doctor:   Dry mouth, stomach pain  Headache, dizziness  Loss of appetite, weight loss  Trouble sleeping  If you notice other side effects that you think are caused by this medicine, tell your doctor  Call your doctor for medical advice about side effects  You may report side effects to FDA at 2-202-FDA-7761    © Copyright [employer] 2022 Information is for End User's use only and may not be sold, redistributed or otherwise used for commercial purposes  The above information is an  only  It is not intended as medical advice for individual conditions or treatments  Talk to your doctor, nurse or pharmacist before following any medical regimen to see if it is safe and effective for you

## 2022-09-26 NOTE — PATIENT INSTRUCTIONS
ADHD in Adults   AMBULATORY CARE:   Attention deficit hyperactivity disorder (ADHD)  is a condition that affects behavior  You may be overactive and have a short attention span  ADHD interferes with how you function in your daily activities at work, school, or home  ADHD may also cause you to have problems getting along with other people  Signs and symptoms of ADHD:  ADHD has 2 main types, based on signs and symptoms  You may have a combination of the 2 main types  A combination is the most common type of ADHD  Inattention:      Get easily distracted or have a hard time focusing    Avoid tasks that need full attention    Not follow or easily forget instructions or directions    Not listen, or drift away when spoken to    Make careless mistakes or lose things    Have problems organizing tasks or chores and managing time    Hyperactivity and impulsivity:      Become easily bored and not finish tasks    Talk a lot, interrupt, or intrude into conversations or games    Change schools or jobs often    Feel stressed, nervous, or worried much of the time    Have problems doing quiet activities or sitting still    Have an addictive behavior such as use of alcohol or illegal drugs, shopping, eating, or working too much    Have more energy than seems normal    Call your local emergency number (911 in the [address]) if:   You feel like hurting yourself or someone else  Seek care immediately if:   You have a seizure  You have trouble breathing, chest pains, or a fast heartbeat  Call your doctor if:   You feel you cannot cope at home, work, or school  You have new symptoms since the last time you visited your healthcare provider  Your symptoms are getting worse  You have questions or concerns about your condition or care  Treatment for ADHD:  The goal of treatment is to help you learn how to control your behavior  A combination of therapy and medication is usually most effective for treating ADHD   You may need any of the following:  Behavior therapy  is used to help you learn to control your actions and improve your behavior  This is done by teaching you how to change your behavior by looking at the results of your actions  Psychotherapy  is also called talk therapy  You may have one-on-one visits with a therapist or with others in a group setting  Stimulants  help you pay attention, concentrate better, and manage your energy  Antidepressants  help decrease or prevent depression or anxiety  It can also be used to treat other behavior problems  Manage ADHD:   Reduce stress  Stress may make your ADHD worse  Ask about ways to calm your body and mind  These may include deep breathing, muscle relaxation, music, and biofeedback  Talk to someone about things that upset you  Learn more about ADHD  The more you know about ADHD, the better you will be able to help yourself  Read books, work with your therapist, and find the support of other people with ADHD  Do not drink alcohol  Alcohol may make your symptoms worse  Create a regular sleep schedule  Sleep can help decrease your symptoms  Try to go to bed and wake up at the same times each day  Do not watch TV, use the computer, or play video games before bed  Electronic devices can make it hard for you to sleep or to stay asleep  Eat a variety of healthy foods  Healthy foods can help increase your concentration and make you feel calmer  Healthy foods include fruits, vegetables, low-fat dairy products, lean meats, fish, whole-grain breads, and cooked beans  Ask your healthcare provider if you need to be on a special diet  Follow up with your doctor as directed:  Write down your questions so you remember to ask them during your visits  © Copyright [employer] 2022 Information is for End User's use only and may not be sold, redistributed or otherwise used for commercial purposes   All illustrations and images included in CareNotes® are the copyrighted property of A D A M , Inc  or [facility] Jonnathan Figueroa   The above information is an  only  It is not intended as medical advice for individual conditions or treatments  Talk to your doctor, nurse or pharmacist before following any medical regimen to see if it is safe and effective for you

## 2022-09-26 NOTE — PROGRESS NOTES
Name: Christiane Can      : 1995      MRN: [id number]  Encounter Provider: JOAN Camejo  Encounter Date: 2022   Encounter department: [place] [address] IN PARTNERSHIP WITH [facility]    Assessment & Plan     1  Adult ADHD  Assessment & Plan:  Patient meets DSM-V criteria for dx  Will start on wellbutrin xl 150mg QD per clinical pharm recommendation and re-evaluate in 6 weeks  If tolerating, plan to increase to therapeutic dose of 300mg  Orders:  -     buPROPion (Wellbutrin XL) 150 mg 24 hr tablet; Take 1 tablet (150 mg total) by mouth every morning    2  Anxiety and depression  Assessment & Plan: Will start on wellbutrin xl 150mg QD per clinical pharm recommendation and re-evaluate in 6 weeks  Patient reports improvement in condition but having issues with sleep  Orders:  -     buPROPion (Wellbutrin XL) 150 mg 24 hr tablet; Take 1 tablet (150 mg total) by mouth every morning    3  Low vitamin D level  Assessment & Plan:  Vitamin D level low-normal  Will start patient on vit D3 2000IU QD per request      Orders:  -     Cholecalciferol 50 MCG (2000 UT) TABS; Take 1 tablet (2,000 Units total) by mouth in the morning            Reviewed labs with patient, unremarkable other than mildly elevated AST  Discuss sleep pattern/habits at next visit, possible sleep med consult  Assess facial rash/dryness  F/U in 6 weeks  Subjective        Patient meets DSM-5 criteria for dx of adult ADHD  Patient specifically has the follow symptoms/characteristics:     - Several inattentive or hyperactive-impulsive symptoms were present prior to age 13 years  - Fails to give close attention to details or makes careless mistakes in schoolwork, at work, or during other activities (eg, overlooks or misses details, work is inaccurate)      - Has difficulty sustaining attention in tasks or play activities (eg, has difficulty remaining focused during lectures, conversations, or lengthy reading)  - Does not seem to listen when spoken to directly (eg, mind seems elsewhere, even in the absence of any obvious distraction)  - Does not follow through on instructions and fails to finish schoolwork, chores, or duties in the workplace (eg, starts tasks but quickly loses focus and is easily sidetracked)  - Has difficulty organizing tasks and activities (eg, difficulty managing sequential tasks; difficulty keeping materials and belongings in order; messy, disorganized work; has poor time management; fails to meet deadlines)  - Loses things necessary for tasks or activities (eg, school materials, pencils, books, tools, wallets, keys, paperwork, eyeglasses, mobile telephones)  - Is easily distracted by extraneous stimuli (for older adolescents and adults, may include unrelated thoughts)  - Is forgetful in daily activities (eg, doing chores, running errands; for older adolescents and adults, returning calls, paying bills, keeping appointments)  - Often leaves seat in situations when remaining seated is expected (eg, leaves his or her place in the classroom, in the office or other workplace, or in other situations that require remaining in place)  - Often runs about or climbs in situations where it is inappropriate  (Note: In adolescents or adults, may be limited to feeling restless )    - Is often "on the go," acting as if "driven by a motor" (eg, is unable to be or uncomfortable being still for extended time, as in restaurants, meetings: may be experienced by others as being restless or difficult to keep up with)  - Often talks excessively  - Often blurts out an answer before a question has been completed (eg, completes people' sentences; cannot wait for turn in conversation)  - Several inattentive or hyperactive-impulsive symptoms were present prior to age 15 years      - Several inattentive or hyperactive-impulsive symptoms are present in two or more settings (eg, at home, school, or work; with friends or relatives; in other activities)  - There is clear evidence that the symptoms interfere with, or reduce the quality of, social, academic, or occupational functioning     - The symptoms do not occur exclusively during the course of schizophrenia or another psychotic disorder and are not better explained by another mental disorder (eg, mood disorder, anxiety disorder, dissociative disorder, personality disorder, substance intoxication or withdrawal)  (See 'Differential diagnosis' below )        Has decreased caffeine intake significantly  Daily max is 100mg daily  Currently reporting issues with sleep, believes this is related to "circumstances    I think its acute" but patient also thinks he may have sleep apnea  Will readdress at next visit  Alcohol consumption at this point "is improving"  Last drink was 1 5 weeks ago approx  Vitamin D level normal-low  Plan to start vit D supplement 2000 IU QD  Patient also reports issues with facial rashes and facial dryness  Will readdress at next visit    Review of Systems   Constitutional: Negative  HENT: Negative  Eyes: Negative  Respiratory: Negative  Cardiovascular: Negative  Gastrointestinal: Negative  Endocrine: Negative  Genitourinary: Negative  Musculoskeletal: Negative  Skin: Negative  Facial dryness with "juana red" cheeks   Allergic/Immunologic: Negative  Neurological: Negative  Hematological: Negative  Psychiatric/Behavioral: Positive for decreased concentration and sleep disturbance  The patient is hyperactive  No current outpatient medications on file prior to visit         Objective     /66 (BP Location: Left arm, Patient Position: Sitting, Cuff Size: Large)   Pulse 85   Temp 98 2 °F (36 8 °C)   Ht 5' 6" (1 676 m)   Wt 78 kg (172 lb)   SpO2 98%   BMI 27 76 kg/m²     Physical Exam  Constitutional:       General: He is not in acute distress  Appearance: Normal appearance  Cardiovascular:      Rate and Rhythm: Normal rate and regular rhythm  Pulses: Normal pulses  Heart sounds: Normal heart sounds  Pulmonary:      Effort: Pulmonary effort is normal       Breath sounds: Normal breath sounds  Neurological:      Mental Status: He is alert and oriented to person, place, and time  Psychiatric:         Mood and Affect: Mood normal          Speech: Speech normal          Behavior: Behavior normal          Thought Content:  Thought content normal          Cognition and Memory: Cognition normal          Judgment: Judgment normal       Comments: + Restless       JOAN Connelly

## 2022-09-28 PROBLEM — F90.9 ADULT ADHD: Status: ACTIVE | Noted: 2022-09-28

## 2022-09-28 LAB
25(OH)D3 SERPL-MCNC: 31 NG/ML (ref 30–100)
ALBUMIN SERPL-MCNC: 4.8 G/DL (ref 3.6–5.1)
ALBUMIN/GLOB SERPL: 1.8 (CALC) (ref 1–2.5)
ALP SERPL-CCNC: 73 U/L (ref 36–130)
ALT SERPL-CCNC: 36 U/L (ref 9–46)
AST SERPL-CCNC: 47 U/L (ref 10–40)
BASOPHILS # BLD AUTO: 30 CELLS/UL (ref 0–200)
BASOPHILS NFR BLD AUTO: 0.5 %
BILIRUB SERPL-MCNC: 0.4 MG/DL (ref 0.2–1.2)
BUN SERPL-MCNC: 15 MG/DL (ref 7–25)
BUN/CREAT SERPL: ABNORMAL (CALC) (ref 6–22)
CALCIUM SERPL-MCNC: 10 MG/DL (ref 8.6–10.3)
CHLORIDE SERPL-SCNC: 103 MMOL/L (ref 98–110)
CHOLEST SERPL-MCNC: 134 MG/DL
CHOLEST/HDLC SERPL: 2.5 (CALC)
CO2 SERPL-SCNC: 28 MMOL/L (ref 20–32)
CREAT SERPL-MCNC: 0.97 MG/DL (ref 0.6–1.24)
EOSINOPHIL # BLD AUTO: 270 CELLS/UL (ref 15–500)
EOSINOPHIL NFR BLD AUTO: 4.5 %
ERYTHROCYTE [DISTWIDTH] IN BLOOD BY AUTOMATED COUNT: 11.7 % (ref 11–15)
GFR/BSA.PRED SERPLBLD CYS-BASED-ARV: 108 ML/MIN/1.73M2
GLOBULIN SER CALC-MCNC: 2.6 G/DL (CALC) (ref 1.9–3.7)
GLUCOSE SERPL-MCNC: 76 MG/DL (ref 65–99)
HCT VFR BLD AUTO: 43.9 % (ref 38.5–50)
HCV AB S/CO SERPL IA: 0.11
HCV AB SERPL QL IA: NORMAL
HDLC SERPL-MCNC: 54 MG/DL
HGB BLD-MCNC: 14.9 G/DL (ref 13.2–17.1)
HIV 1+2 AB+HIV1 P24 AG SERPL QL IA: ABNORMAL
HIV 2 AB SERPL QL IA: NEGATIVE
HIV1 AB SERPL QL IA: NEGATIVE
HIV1 RNA SERPL QL NAA+PROBE: NOT DETECTED
LDLC SERPL CALC-MCNC: 67 MG/DL (CALC)
LYMPHOCYTES # BLD AUTO: 1560 CELLS/UL (ref 850–3900)
LYMPHOCYTES NFR BLD AUTO: 26 %
MCH RBC QN AUTO: 29.1 PG (ref 27–33)
MCHC RBC AUTO-ENTMCNC: 33.9 G/DL (ref 32–36)
MCV RBC AUTO: 85.7 FL (ref 80–100)
MONOCYTES # BLD AUTO: 480 CELLS/UL (ref 200–950)
MONOCYTES NFR BLD AUTO: 8 %
NEUTROPHILS # BLD AUTO: 3660 CELLS/UL (ref 1500–7800)
NEUTROPHILS NFR BLD AUTO: 61 %
NONHDLC SERPL-MCNC: 80 MG/DL (CALC)
PLATELET # BLD AUTO: 349 THOUSAND/UL (ref 140–400)
PMV BLD REES-ECKER: 9.6 FL (ref 7.5–12.5)
POTASSIUM SERPL-SCNC: 4.6 MMOL/L (ref 3.5–5.3)
PROT SERPL-MCNC: 7.4 G/DL (ref 6.1–8.1)
RBC # BLD AUTO: 5.12 MILLION/UL (ref 4.2–5.8)
SODIUM SERPL-SCNC: 139 MMOL/L (ref 135–146)
TRIGL SERPL-MCNC: 51 MG/DL
TSH SERPL-ACNC: 1.72 MIU/L (ref 0.4–4.5)
WBC # BLD AUTO: 6 THOUSAND/UL (ref 3.8–10.8)

## 2022-09-29 ENCOUNTER — OFFICE VISIT (OUTPATIENT)
Dept: FAMILY MEDICINE CLINIC | Facility: CLINIC | Age: 27
End: 2022-09-29

## 2022-09-29 VITALS
BODY MASS INDEX: 27.64 KG/M2 | HEART RATE: 85 BPM | DIASTOLIC BLOOD PRESSURE: 66 MMHG | WEIGHT: 172 LBS | TEMPERATURE: 98.2 F | SYSTOLIC BLOOD PRESSURE: 120 MMHG | OXYGEN SATURATION: 98 % | HEIGHT: 66 IN

## 2022-09-29 DIAGNOSIS — F90.9 ADULT ADHD: Primary | ICD-10-CM

## 2022-09-29 DIAGNOSIS — R79.89 LOW VITAMIN D LEVEL: ICD-10-CM

## 2022-09-29 DIAGNOSIS — F32.A ANXIETY AND DEPRESSION: ICD-10-CM

## 2022-09-29 DIAGNOSIS — F41.9 ANXIETY AND DEPRESSION: ICD-10-CM

## 2022-09-29 PROCEDURE — 99214 OFFICE O/P EST MOD 30 MIN: CPT | Performed by: NURSE PRACTITIONER

## 2022-09-29 RX ORDER — BUPROPION HYDROCHLORIDE 150 MG/1
150 TABLET ORAL EVERY MORNING
Qty: 90 TABLET | Refills: 1 | Status: SHIPPED | OUTPATIENT
Start: 2022-09-29

## 2022-09-29 NOTE — ASSESSMENT & PLAN NOTE
Will start on wellbutrin xl 150mg QD per clinical pharm recommendation and re-evaluate in 6 weeks  Patient reports improvement in condition but having issues with sleep

## 2022-09-29 NOTE — ASSESSMENT & PLAN NOTE
Patient meets DSM-V criteria for dx  Will start on wellbutrin xl 150mg QD per clinical pharm recommendation and re-evaluate in 6 weeks  If tolerating, plan to increase to therapeutic dose of 300mg

## 2022-11-08 NOTE — PROGRESS NOTES
Name: Andree Williamson      : 1995      MRN: [id number]  Encounter Provider: JOAN Swartz  Encounter Date: 11/10/2022   Encounter department: [place]  IN PARTNERSHIP WITH [facility]    Assessment & Plan     1  Adult ADHD  Assessment & Plan:  - "More wins than loses    physical anxiety and impulse control improved"  - Reports decreased desire to consume alcohol  - Still having issues with focus and executive functioning  - No issues with nausea and sleep  - Reports little to no caffeine intake  - CHEIKH-7 score of 10 today  - Requesting to discuss other medications with clinical pharm team    Spoke with our clinical pharmacist Bela Dior while in the office today  Based on their discussion, current treatment options include: 1) increase bupropion to 300mg QD or 2) switch bupropion to an SSRI + stimulant (unable to add stimulant to bupropion r/t seizure risk)  Patient plans to think about both treatment options over the weekend and then contact clinical pharm next week  Will await further updates  2  Anxiety and depression    3  Snoring  Assessment & Plan:  - Feeling un-refreshed daily regardless of amount of sleep  - Has been working on sleep hygiene    minimal improvement   - "Unrestful sleep overall"     - Has been exercising approx 5x per week    Will refer patient to sleep medicine team ([address] Sleep in [place]) to r/o RADHA and assess for other sleep-related comorbidities  Orders:  -     Ambulatory Referral to Sleep Medicine; Future    4   Facial erythema  Assessment & Plan:  - Onset of symptoms/condition:  "years ago"  - Location/Area affected: b/l cheeks  - Duration of problem: worse when dehydrated and with activity  - Character/Quality: occasionally "stingy when dehydrated", not itchy   - Condition aggravated or alleviated with: alleviated with hydration and rest  - Treatments so far: moisturizing cream  minimal relief     Current differentials include: unknown dermatitis vs rosacea  Low dose kenalog cream prescribed for patient to r/o a dermatitis cause  Will reassess at f/u  Orders:  -     triamcinolone (KENALOG) 0 025 % cream; Apply topically if needed for rash           Subjective      LAST VISIT 9/29: Due to ADHD s/s and underlying anxiety/depression    "Patient meets DSM-V criteria for ADHD dx  Will start on wellbutrin xl 150mg QD per clinical pharm recommendation and re-evaluate in 6 weeks  If tolerating, plan to increase to therapeutic dose of 300mg "     Review of Systems   Constitutional: Negative  HENT: Negative  Eyes: Negative  Respiratory: Negative  Cardiovascular: Negative  Gastrointestinal: Negative  Endocrine: Negative  Genitourinary: Negative  Musculoskeletal: Negative  Skin: Positive for rash  Allergic/Immunologic: Negative  Neurological: Negative  Hematological: Negative  Psychiatric/Behavioral: Positive for decreased concentration and sleep disturbance  Negative for agitation, self-injury and suicidal ideas  The patient is not nervous/anxious and is not hyperactive  Current Outpatient Medications on File Prior to Visit   Medication Sig   • buPROPion (Wellbutrin XL) 150 mg 24 hr tablet Take 1 tablet (150 mg total) by mouth every morning   • Cholecalciferol 50 MCG (2000 UT) TABS Take 1 tablet (2,000 Units total) by mouth in the morning       Objective     /62 (BP Location: Left arm, Patient Position: Sitting, Cuff Size: Large)   Pulse 88   Ht 5' 6" (1 676 m)   Wt 73 6 kg (162 lb 3 2 oz)   SpO2 98%   BMI 26 18 kg/m²     Physical Exam  Constitutional:       General: He is not in acute distress  Appearance: Normal appearance  Eyes:      Extraocular Movements: Extraocular movements intact  Conjunctiva/sclera: Conjunctivae normal    Pulmonary:      Effort: Pulmonary effort is normal  No respiratory distress  Breath sounds: No wheezing     Abdominal:      General: Abdomen is flat  Musculoskeletal:         General: Normal range of motion  Cervical back: Normal range of motion  Skin:     Findings: Erythema and rash present  Rash is not crusting, macular, nodular, papular, purpuric, pustular, scaling, urticarial or vesicular  Comments: Diffuse redness noted to b/l cheeks   Neurological:      Mental Status: He is alert and oriented to person, place, and time  Psychiatric:         Attention and Perception: Attention normal          Mood and Affect: Mood normal  Mood is not anxious  Affect is not inappropriate  Speech: Speech is not rapid and pressured  Behavior: Behavior normal          Thought Content: Thought content normal          Cognition and Memory: Cognition normal          Judgment: Judgment normal  Judgment is not impulsive or inappropriate         Ernestina Shows, SWETHANP

## 2022-11-10 ENCOUNTER — OFFICE VISIT (OUTPATIENT)
Dept: FAMILY MEDICINE CLINIC | Facility: CLINIC | Age: 27
End: 2022-11-10

## 2022-11-10 ENCOUNTER — DOCUMENTATION (OUTPATIENT)
Dept: FAMILY MEDICINE CLINIC | Facility: CLINIC | Age: 27
End: 2022-11-10

## 2022-11-10 VITALS
WEIGHT: 162.2 LBS | HEIGHT: 66 IN | BODY MASS INDEX: 26.07 KG/M2 | OXYGEN SATURATION: 98 % | DIASTOLIC BLOOD PRESSURE: 62 MMHG | SYSTOLIC BLOOD PRESSURE: 114 MMHG | HEART RATE: 88 BPM

## 2022-11-10 DIAGNOSIS — F90.9 ADULT ADHD: Primary | ICD-10-CM

## 2022-11-10 DIAGNOSIS — F32.A ANXIETY AND DEPRESSION: ICD-10-CM

## 2022-11-10 DIAGNOSIS — L53.9 FACIAL ERYTHEMA: ICD-10-CM

## 2022-11-10 DIAGNOSIS — F41.9 ANXIETY AND DEPRESSION: ICD-10-CM

## 2022-11-10 DIAGNOSIS — R06.83 SNORING: ICD-10-CM

## 2022-11-10 RX ORDER — TRIAMCINOLONE ACETONIDE 0.25 MG/G
CREAM TOPICAL AS NEEDED
Qty: 30 G | Refills: 1 | Status: SHIPPED | OUTPATIENT
Start: 2022-11-10

## 2022-11-10 NOTE — PROGRESS NOTES
[address]    Bay White is a 32 y o  male who was referred to the pharmacist for medication education and management, referred by JOAN Carrero   Telemedicine consent  The patient was identified by name and date of birth  Bay White was informed that this is a telemedicine visit and that the visit is being conducted through Telephone  My office door was closed  No one else was in the room  He acknowledged consent and understanding of privacy and security of the video platform  The patient has agreed to participate and understands they can discontinue the visit at any time  Assessment/ Plan       1  Mental Health  · Concern for underlying ADHD without confirmed diagnosis plus depression/anxiety  · Partial response to bupropion  Discussed next options of increasing bupropion or switching bupropion to SSRI plus low dose stimulant  Cannot combine bupropion plus stimulant due to drug interaction (increased seizure risk)  · He is considering increasing bupropion to 300 mg daily  He would like to think about it over the weekend and call me back    Changes to Medication Regimen:   • No changes today  Patient will call me on Monday with decision  2  Medication Reconciliation:   • Not currently on prescription or OTC medication     Follow-up:   11/14/22    Subjective     1  Medication Adherence/ Tolerability:  • Bupropion - first 3 days some side effects (altered mental status, nausea, insomnia) but now tolerating well      2  ADHD  • Improvement in impulse control since starting bupropion   · Still having a hard time concentrating in a conversation and difficult organizing thoughts     2  Lifestyle/ social hx:   o Caffeine intake: Significantly decreased   Now has diet coke occasionally   o Alcohol use: No recent alcohol use  • Physical Activity: Exercises 4-5 session in the gym ~1 hour resistance training   • Sleep: attempting to get 8 hours per night      Objective       Current Outpatient Medications   Medication Instructions   • buPROPion (WELLBUTRIN XL) 150 mg, Oral, Every morning   • Cholecalciferol 2,000 Units, Oral, Daily   • triamcinolone (KENALOG) 0 025 % cream Topical, As needed     Allergies   Allergen Reactions   • Nuts - Food Allergy Anaphylaxis and Hives       Immunization History   Administered Date(s) Administered   • Hep A, ped/adol, 2 dose 04/01/2009, 05/10/2013   • Influenza, seasonal, injectable 10/10/2002, 09/29/2003, 11/02/2005   • Meningococcal MCV4P 06/26/2007, 05/17/2011   • Td (adult), adsorbed 06/10/2005   • Tdap 04/01/2009   • Tuberculin Skin Test-PPD Intradermal 05/11/2011, 07/07/2020       Vitals: There were no vitals filed for this visit      Labs:    Lab Results   Component Value Date    SODIUM 139 09/24/2022    K 4 6 09/24/2022    EGFR 108 09/24/2022    CREATININE 0 97 09/24/2022         Pharmacist Tracking Tool     Pharmacist Tracking Tool  Reason For Outreach: Embedded Pharmacist  Demographics:  Intervention Method: Phone  Type of Intervention: Follow-Up  Topics Addressed: Psychiatric disorders  Pharmacologic Interventions: N/A  Non-Pharmacologic Interventions: Disease state education and Medication/Device education  Time:  Direct Patient Care: 60 mins  Care Coordination: 30 mins  Recommendation Recipient: Patient/Caregiver and Provider  Outcome: Pending/ Follow-up Required

## 2022-11-10 NOTE — ASSESSMENT & PLAN NOTE
- "More wins than loses    physical anxiety and impulse control improved"  - Reports decreased desire to consume alcohol  - Still having issues with focus and executive functioning  - No issues with nausea and sleep  - Reports little to no caffeine intake  - CHEIKH-7 score of 10 today  - Requesting to discuss other medications with clinical pharm team    Spoke with our clinical pharmacist Shivam Cooper while in the office today  Based on their discussion, current treatment options include: 1) increase bupropion to 300mg QD or 2) switch bupropion to an SSRI + stimulant (unable to add stimulant to bupropion r/t seizure risk)  Patient plans to think about both treatment options over the weekend and then contact clinical pharm next week  Will await further updates

## 2022-11-10 NOTE — ASSESSMENT & PLAN NOTE
- Onset of symptoms/condition:  "years ago"  - Location/Area affected: b/l cheeks  - Duration of problem: worse when dehydrated and with activity  - Character/Quality: occasionally "stingy when dehydrated", not itchy   - Condition aggravated or alleviated with: alleviated with hydration and rest  - Treatments so far: moisturizing cream  minimal relief     Current differentials include: unknown dermatitis vs rosacea  Low dose kenalog cream prescribed for patient to r/o a dermatitis cause  Will reassess at f/u

## 2022-11-10 NOTE — ASSESSMENT & PLAN NOTE
- Feeling un-refreshed daily regardless of amount of sleep  - Has been working on sleep hygiene    minimal improvement   - "Unrestful sleep overall"     - Has been exercising approx 5x per week    Will refer patient to sleep medicine team ([address] Sleep in [place]) to r/o RADHA and assess for other sleep-related comorbidities

## 2022-11-10 NOTE — PATIENT INSTRUCTIONS
Tips for Healthy Sleep   AMBULATORY CARE:   What you need to know about healthy sleep:  Healthy sleep, or sleep hygiene, is important to your physical, mental, and emotional health  Sleep affects almost every part of your body, including your brain, heart, metabolism, immune system, and mood  Good sleep habits can help you fall asleep and stay asleep during the night  Poor quality sleep or a long-term lack of sleep increases your risk for certain disorders  These include high blood pressure, cardiovascular disease, obesity, depression, and diabetes  What you need to know about sleep stages: The 2 main kinds of sleep are rapid eye movement (REM) and non-REM  You cycle through REM and non-REM many times during the night  Stage 1 non-REM sleep  is when you first fall asleep  This stage is short  Your brain waves slow and your body relaxes  Stage 2 non-REM sleep  is a period of light sleep before you move into deeper sleep  You spend the most time in this stage during the night  Your body temperature drops and your body relaxes even more  Stage 3 non-REM sleep  is a period of deep sleep that starts after stage 2  This stage is needed to feel refreshed in the morning  REM sleep  starts about 90 minutes after you fall asleep  Your eyes move from side to side  Your heart rate, blood pressure, and breathing become faster  Most of your dreams occur during REM sleep  As you age, you spend less time in REM sleep  How much sleep you need:  Each person needs a different amount of sleep  Your sleep patterns and needs change as you age  In general, school-aged children and teenagers need about 9 to 10 hours of sleep a night  Most adults need about 7 to 9 hours of sleep a night  After age 61 years, sleep may be lighter and for less time, with more periods of wakefulness  Older adults may fall asleep and wake up earlier than they did at a younger age   Medicines or conditions, such as chronic pain, may keep older adults awake  How to know you are getting healthy sleep:   Keep a 2-week log of your sleep  Write down what time you got up, what you did that day, and anything else that could affect your sleep  Keep a record of your sleep patterns, and any sleeping problems you have  Bring the record to your follow-up visits  Ask someone who lives with you if they notice anything about your sleep  For example, maybe you snore loudly or stop breathing for short periods  Tell your healthcare provider if your legs twitch or you feel like you can't keep them still  Your healthcare provider may refer to you a sleep specialist or cognitive behavioral therapy  Call your doctor if:   Someone has told you that you stop breathing when you sleep  Your legs twitch and it keeps you from sleeping  You begin to use drugs or alcohol to fall asleep  You have questions or concerns about your sleep habits  How to improve your sleep:  Your daily routines influence your sleep  Nutrition, medicines, your schedule, and what you do in the evenings can affect your sleep  Do the following to help improve your sleep:  Create a sleep schedule  Go to sleep and wake up at the same time every day  Be consistent, even on weekends or when you travel  Do not go to bed unless you are sleepy  Set up a calming routine before bed  Soak in a warm bath, listen to relaxing music, or read a book  Turn off all electronics at least 30 minutes before bedtime  Try not to watch television or use any electronics in the bedroom  Limit naps to 20 or 30 minutes, earlier in the day  Naps could make it hard for you to fall asleep at bedtime  Keep your bedroom cool, quiet, and dark  Turn on white noise, such as a fan, to help you relax  Get up if you do not fall asleep within 20 minutes  Move to another room and do something relaxing until you become sleepy  Limit caffeine, alcohol, and food to earlier in the day    Only drink caffeine in the morning  Do not drink alcohol within 6 hours of bedtime  Do not eat a heavy meal right before you go to bed  Limit how much liquid you drink in the evenings and right before bed  If you are prescribed diuretics, or water pills, take them early in the day  Exercise regularly  Daily exercise may help you sleep better  Do not exercise within 3 hours of bedtime  Follow up with your doctor as directed:  Bring your sleep log with you  Write down your questions so you remember to ask them during your visits  © Copyright [employer] 2022 Information is for End User's use only and may not be sold, redistributed or otherwise used for commercial purposes  All illustrations and images included in CareNotes® are the copyrighted property of A D A M , Inc  or [facility] Jonnathan Figueroa   The above information is an  only  It is not intended as medical advice for individual conditions or treatments  Talk to your doctor, nurse or pharmacist before following any medical regimen to see if it is safe and effective for you

## 2022-11-15 DIAGNOSIS — F41.9 ANXIETY AND DEPRESSION: ICD-10-CM

## 2022-11-15 DIAGNOSIS — F90.9 ADULT ADHD: Primary | ICD-10-CM

## 2022-11-15 DIAGNOSIS — F32.A ANXIETY AND DEPRESSION: ICD-10-CM

## 2022-11-15 RX ORDER — BUPROPION HYDROCHLORIDE 300 MG/1
300 TABLET ORAL EVERY MORNING
Qty: 90 TABLET | Refills: 3 | Status: SHIPPED | OUTPATIENT
Start: 2022-11-15

## 2022-11-15 NOTE — TELEPHONE ENCOUNTER
Patient called back and would like to try higher dose of bupropion for ADHD symptoms  Partial response to lower dose  Plan:   Recommend to increase bupropion XL to 300 mg once daily  RX pended for PCP       Follow up call with clinical pharmacist 12/8/22     Pharmacist Tracking Tool  Reason For Outreach: Embedded Pharmacist  Demographics:  Intervention Method: Phone  Type of Intervention: Follow-Up  Topics Addressed: Other  Pharmacologic Interventions: Dose or Frequency Adjusted  Non-Pharmacologic Interventions: N/A  Time:  Direct Patient Care: 5 mins  Care Coordination: 5 mins  Recommendation Recipient: Patient/Caregiver and Provider  Outcome: Accepted

## 2022-11-21 ENCOUNTER — TELEPHONE (OUTPATIENT)
Dept: FAMILY MEDICINE CLINIC | Facility: CLINIC | Age: 27
End: 2022-11-21

## 2022-11-21 NOTE — TELEPHONE ENCOUNTER
Patient called he increased to 2 tabs daily on 11/17  Experiencing some side effects on Friday feeling more such as feeling more emotional  Also states he had triggering event which may have precipitated those symptoms  Denies thoughts of suicide or suicidal ideations  Patient would like to continue higher dose for now and will follow up with clinical pharmacist on 12/8/22 to discuss medication side effects further       Pharmacist Tracking Tool  Reason For Outreach: Embedded Pharmacist  Demographics:  Intervention Method: Phone  Type of Intervention: Follow-Up  Topics Addressed: Other  Pharmacologic Interventions: Prevent or Manage KIRILL  Non-Pharmacologic Interventions: Medication/Device education  Time:  Direct Patient Care: 15 mins  Care Coordination: 5 mins  Recommendation Recipient: Patient/Caregiver  Outcome: Accepted

## 2022-12-08 ENCOUNTER — CLINICAL SUPPORT (OUTPATIENT)
Dept: FAMILY MEDICINE CLINIC | Facility: CLINIC | Age: 27
End: 2022-12-08

## 2022-12-08 DIAGNOSIS — F90.9 ADULT ADHD: Primary | ICD-10-CM

## 2022-12-08 DIAGNOSIS — F41.9 ANXIETY AND DEPRESSION: ICD-10-CM

## 2022-12-08 DIAGNOSIS — F32.A ANXIETY AND DEPRESSION: ICD-10-CM

## 2022-12-08 RX ORDER — BUPROPION HYDROCHLORIDE 150 MG/1
TABLET ORAL
Qty: 15 TABLET | Refills: 0 | Status: SHIPPED | OUTPATIENT
Start: 2022-12-08

## 2022-12-08 RX ORDER — ESCITALOPRAM OXALATE 10 MG/1
10 TABLET ORAL DAILY
Qty: 90 TABLET | Refills: 1 | Status: SHIPPED | OUTPATIENT
Start: 2022-12-08

## 2022-12-08 NOTE — PROGRESS NOTES
[address]    Enoch Eason is a 32 y o  male who was referred to the pharmacist for medication education and management, referred by JOAN Quiros   Telemedicine consent  The patient was identified by name and date of birth  Enoch Eason was informed that this is a telemedicine visit and that the visit is being conducted through Telephone  My office door was closed  No one else was in the room  He acknowledged consent and understanding of privacy and security of the video platform  The patient has agreed to participate and understands they can discontinue the visit at any time  Assessment/ Plan       1  Mental Health  ADHD with Anxiety / Depression  · Partial response to bupropion  Treated anxiety/depression sx but not ADHD  Still having difficulty with concentration, inattention, and organization of thoughts  Changes to Medication Regimen: If PCP is in agreement with plan  · Taper down on bupropion to 150 mg daily x 2 weeks, then 150 mg every other day x 1 week then discontinue  · Once bupropion is stopped initiate escitalopram 10 mg daily  · Plan to start stimulant medication once patient is on stable dose of SSRI    2  Medication Reconciliation:   • Medication list up to date     Follow-up: 5 weeks     Subjective     1  Medication Adherence/ Tolerability:  • Bupropion 300 mg daily- treating anxiety / depression but not helping with ADHD symptoms of concentration and organization of thoughts      2  ADHD with Anxiety / Depression  • Improvement in impulse control since starting bupropion   · Still having a hard time concentrating in a conversation and difficult organizing thoughts   · Having problems with inattention     2  Lifestyle/ social hx:   o Caffeine intake: Significantly decreased   Now has diet coke occasionally   o Alcohol use: No recent alcohol use  • Physical Activity: Exercises 4-5 session in the gym ~1 hour resistance training   • Sleep: attempting to get 8 hours per night      Objective       Current Outpatient Medications   Medication Instructions   • buPROPion (WELLBUTRIN XL) 300 mg, Oral, Every morning   • Cholecalciferol 2,000 Units, Oral, Daily   • triamcinolone (KENALOG) 0 025 % cream Topical, As needed     Allergies   Allergen Reactions   • Nuts - Food Allergy Anaphylaxis and Hives       Immunization History   Administered Date(s) Administered   • Hep A, ped/adol, 2 dose 04/01/2009, 05/10/2013   • Influenza, seasonal, injectable 10/10/2002, 09/29/2003, 11/02/2005   • Meningococcal MCV4P 06/26/2007, 05/17/2011   • Td (adult), adsorbed 06/10/2005   • Tdap 04/01/2009   • Tuberculin Skin Test-PPD Intradermal 05/11/2011, 07/07/2020       Vitals: There were no vitals filed for this visit      Labs:    Lab Results   Component Value Date    SODIUM 139 09/24/2022    K 4 6 09/24/2022    EGFR 108 09/24/2022    CREATININE 0 97 09/24/2022         Pharmacist Tracking Tool     Pharmacist Tracking Tool  Reason For Outreach: Embedded Pharmacist  Demographics:  Intervention Method: Phone  Type of Intervention: Follow-Up  Topics Addressed: Anxiety, Depression and Psychiatric disorders  Pharmacologic Interventions: Medication Initiation and Medication Discontinuation  Non-Pharmacologic Interventions: Disease state education and Medication/Device education  Time:  Direct Patient Care: 30 mins  Care Coordination: 15 mins  Recommendation Recipient: Patient/Caregiver and Provider  Outcome: Accepted

## 2023-01-12 ENCOUNTER — CLINICAL SUPPORT (OUTPATIENT)
Dept: FAMILY MEDICINE CLINIC | Facility: CLINIC | Age: 28
End: 2023-01-12

## 2023-01-12 DIAGNOSIS — F90.9 ADULT ADHD: Primary | ICD-10-CM

## 2023-01-12 NOTE — PROGRESS NOTES
[address] Pharmacist    Trista Snyder is a 32 y o  male who was referred to the pharmacist for medication education and management, referred by JOAN Delgado   Telemedicine consent  The patient was identified by name and date of birth  Trista Snyder was informed that this is a telemedicine visit and that the visit is being conducted through Telephone  My office door was closed  No one else was in the room  He acknowledged consent and understanding of privacy and security of the video platform  The patient has agreed to participate and understands they can discontinue the visit at any time  Assessment/ Plan       1  Mental Health  ADHD with Anxiety / Depression  · Having fatigue with escitalopram but no other issues  Anxiety/ depression controlled at this time  ADHD symptoms not controlled since stopping bupropion  Plan was to start stimulant medication once on stable dose of escitalopram  Reviewed that adderall/ stimulants are controlled substances which are regulated via PDMP  Discussed not sharing medication with anyone and safe storage practices  Discussed that medication fills at pharmacy are viewable via [address] and to make PCP aware of any other medications prescribed (controlled or non-controlled) by other providers  Patient was in agreement with this  Changes to Medication Regimen: If PCP is in agreement with plan  · Continue escitalopram 10 mg daily for anxiety/depression   · Initiate generic Adderall 5 mg daily (rx pended) for ADHD daily in morning  2  Medication Reconciliation:   • Medication list up to date     Follow-up: 3 weeks    Subjective     1  Medication Adherence/ Tolerability:  • Bupropion DISCONTINUED around 12/22/23  • Escitalopram 10 mg started around 12/26/22  Has been taking for about ~3 weeks now  Experiencing fatigue since starting medication  Switched timing to 6 PM and made fatigue worse in the morning  2  ADHD with Anxiety / Depression  • Had improvement in impulse control while on bupropion, however it was not controlling other ADHD   · Still having a hard time concentrating in a conversation and difficult organizing thoughts   · Having problems with inattention     2  Lifestyle/ social hx:   o Caffeine intake: Significantly decreased  Now has diet coke occasionally   o Alcohol use: No recent alcohol use  • Physical Activity: Exercises 4-5 session in the gym ~1 hour resistance training   • Sleep: attempting to get 8 hours per night  • Works ~7 AM - 3:30 PM       Objective       Current Outpatient Medications   Medication Instructions   • buPROPion (Wellbutrin XL) 150 mg 24 hr tablet Decrease to 150 mg daily x 2 weeks, then 150 mg every other day x 1 week, then discontinue   • Cholecalciferol 2,000 Units, Oral, Daily   • escitalopram (LEXAPRO) 10 mg, Oral, Daily, -Start when bupropion taper is complete   • triamcinolone (KENALOG) 0 025 % cream Topical, As needed     Allergies   Allergen Reactions   • Nuts - Food Allergy Anaphylaxis and Hives       Immunization History   Administered Date(s) Administered   • Hep A, ped/adol, 2 dose 04/01/2009, 05/10/2013   • Influenza, seasonal, injectable 10/10/2002, 09/29/2003, 11/02/2005   • Meningococcal MCV4P 06/26/2007, 05/17/2011   • Td (adult), adsorbed 06/10/2005   • Tdap 04/01/2009   • Tuberculin Skin Test-PPD Intradermal 05/11/2011, 07/07/2020       Vitals: There were no vitals filed for this visit      Labs:    Lab Results   Component Value Date    SODIUM 139 09/24/2022    K 4 6 09/24/2022    EGFR 108 09/24/2022    CREATININE 0 97 09/24/2022         Pharmacist Tracking Tool     Pharmacist Tracking Tool  Reason For Outreach: Embedded Pharmacist  Demographics:  Intervention Method: Phone  Type of Intervention: Follow-Up  Topics Addressed: Anxiety, Depression and Psychiatric disorders  Pharmacologic Interventions: Medication Initiation  Non-Pharmacologic Interventions: Disease state education and Medication/Device education  Time:  Direct Patient Care: 30 mins  Care Coordination: 15 mins  Recommendation Recipient: Patient/Caregiver and Provider  Outcome: Accepted

## 2023-01-13 ENCOUNTER — TELEPHONE (OUTPATIENT)
Dept: FAMILY MEDICINE CLINIC | Facility: CLINIC | Age: 28
End: 2023-01-13

## 2023-01-13 RX ORDER — DEXTROAMPHETAMINE SACCHARATE, AMPHETAMINE ASPARTATE, DEXTROAMPHETAMINE SULFATE AND AMPHETAMINE SULFATE 1.25; 1.25; 1.25; 1.25 MG/1; MG/1; MG/1; MG/1
5 TABLET ORAL DAILY
Qty: 30 TABLET | Refills: 0 | Status: SHIPPED | OUTPATIENT
Start: 2023-01-13

## 2023-01-13 NOTE — TELEPHONE ENCOUNTER
Prior auth received via cover my meds Key: [id number]    Status: This request has been approved using information available on the patient's profile   [id number];WHVXUJ:[id number] Per cover my meds

## 2023-02-02 ENCOUNTER — CLINICAL SUPPORT (OUTPATIENT)
Dept: FAMILY MEDICINE CLINIC | Facility: CLINIC | Age: 28
End: 2023-02-02

## 2023-02-02 DIAGNOSIS — F90.9 ADULT ADHD: Primary | ICD-10-CM

## 2023-02-02 DIAGNOSIS — F41.9 ANXIETY AND DEPRESSION: ICD-10-CM

## 2023-02-02 DIAGNOSIS — F32.A ANXIETY AND DEPRESSION: ICD-10-CM

## 2023-02-02 NOTE — PROGRESS NOTES
[address]    Kiara Lim is a 25 y o  male who was referred to the pharmacist for medication education and management, referred by JOAN Head   Telemedicine consent  The patient was identified by name and date of birth  Kiara Lim was informed that this is a telemedicine visit and that the visit is being conducted through Telephone  My office door was closed  No one else was in the room  He acknowledged consent and understanding of privacy and security of the video platform  The patient has agreed to participate and understands they can discontinue the visit at any time  Assessment/ Plan       1  Mental Health  ADHD with Anxiety / Depression  · Improved control in ADHD symptoms  Less fatigue and improvements in concentration, thought organization, and attention  Changes to Medication Regimen: If PCP is in agreement with plan  · Continue escitalopram 10 mg daily for anxiety/depression   · Continue Adderall 5 mg daily daily in the morning for ADHD    2  Medication Reconciliation:   • Medication list up to date     Follow-up: 4 weeks in person with PCP and clinical pharmacist     Subjective     1  Medication Adherence/ Tolerability:  • Escitalopram 10 mg daily  • Adderall 5 mg daily - takes about 45 min before going into work , insomnia started for first 3 days but has resolved  Sometimes will wake up in the middle of the night but able to tolerate      2  ADHD with Anxiety / Depression  • Had improvement in impulse control while on bupropion, however it was not controlling other ADHD   · Since starting escitalopram with adderall he has noted improvements in concentration, organizing thoughts, attention  Does notice wearing off effect of Adderall by 4 PM which makes motivation in going to gym difficult, but overall has been doing well     2  Lifestyle/ social hx:   o Caffeine intake: Significantly decreased   Now has diet coke occasionally   o Alcohol use: No recent alcohol use  • Physical Activity: Exercises 4-5 session in the gym ~1 hour resistance training   • Sleep: attempting to get 8 hours per night  • Works ~7 AM - 3:30 PM       Objective       Current Outpatient Medications   Medication Instructions   • amphetamine-dextroamphetamine (ADDERALL, 5MG,) 5 MG tablet 5 mg, Oral, Daily   • Cholecalciferol 2,000 Units, Oral, Daily   • escitalopram (LEXAPRO) 10 mg, Oral, Daily, -Start when bupropion taper is complete   • triamcinolone (KENALOG) 0 025 % cream Topical, As needed     Allergies   Allergen Reactions   • Nuts - Food Allergy Anaphylaxis and Hives       Immunization History   Administered Date(s) Administered   • Hep A, ped/adol, 2 dose 04/01/2009, 05/10/2013   • Influenza, seasonal, injectable 10/10/2002, 09/29/2003, 11/02/2005   • Meningococcal MCV4P 06/26/2007, 05/17/2011   • Td (adult), adsorbed 06/10/2005   • Tdap 04/01/2009   • Tuberculin Skin Test-PPD Intradermal 05/11/2011, 07/07/2020       Vitals: There were no vitals filed for this visit      Labs:    Lab Results   Component Value Date    SODIUM 139 09/24/2022    K 4 6 09/24/2022    EGFR 108 09/24/2022    CREATININE 0 97 09/24/2022       Pharmacist Tracking Tool     Pharmacist Tracking Tool  Reason For Outreach: Embedded Pharmacist  Demographics:  Intervention Method: Phone  Type of Intervention: Follow-Up  Topics Addressed: Anxiety, Depression and Psychiatric disorders  Pharmacologic Interventions: N/A  Non-Pharmacologic Interventions: Disease state education and Medication/Device education  Time:  Direct Patient Care: 15 mins  Care Coordination: 15 mins  Recommendation Recipient: Patient/Caregiver and Provider  Outcome: Accepted

## 2023-02-20 ENCOUNTER — TELEPHONE (OUTPATIENT)
Dept: FAMILY MEDICINE CLINIC | Facility: CLINIC | Age: 28
End: 2023-02-20

## 2023-02-20 DIAGNOSIS — F90.9 ADULT ADHD: ICD-10-CM

## 2023-02-20 RX ORDER — DEXTROAMPHETAMINE SACCHARATE, AMPHETAMINE ASPARTATE, DEXTROAMPHETAMINE SULFATE AND AMPHETAMINE SULFATE 1.25; 1.25; 1.25; 1.25 MG/1; MG/1; MG/1; MG/1
5 TABLET ORAL DAILY
Qty: 30 TABLET | Refills: 0 | Status: SHIPPED | OUTPATIENT
Start: 2023-02-20

## 2023-02-23 ENCOUNTER — RA CDI HCC (OUTPATIENT)
Dept: OTHER | Facility: HOSPITAL | Age: 28
End: 2023-02-23

## 2023-02-23 NOTE — PROGRESS NOTES
[facility]  coding opportunities          Chart Reviewed number of suggestions sent to Provider: 1   F32  A Depression: found on active problem list, Can Depression be further classified  This is a reminder to address ALL HCC (risk adjustment) codes as found on active problem list for 2023 as patient scores reset to zero RYAN      Patients Insurance        Commercial Insurance: [address] [place] [address]

## 2023-02-28 NOTE — PROGRESS NOTES
Name: Young Rooney      : 1995      MRN: [id number]  Encounter Provider: JOAN Vega  Encounter Date: 3/2/2023   Encounter department: [facility]  IN PARTNERSHIP WITH [facility]    Assessment & Plan     1  Adult ADHD  Assessment & Plan:  Patient reports a great improvement in his overall ability to focus and complete tasks  Patient is reporting however " my energy level seems to crash at the end of the day"  Patient plans on discussing his concern with clinical pharmacy at their next follow-up  Reviewed controlled substances protocol with the patient, agreement was signed  Drug screen was ordered and collected today  Orders:  -     Pain Management Base Prof W/ Confirmation, Urine; Future  -     Pain Management Base Prof W/ Confirmation, Urine    2  Anxiety and depression  Assessment & Plan:  PHQ of 5 today, will continue to monitor       3  Controlled substance agreement signed  -     Pain Management Base Prof W/ Confirmation, Urine; Future  -     Pain Management Base Prof W/ Confirmation, Urine           Subjective      Follow-up visit to complete controlled substances protocol and reassess mental health     Review of Systems   Constitutional: Negative  HENT: Negative  Eyes: Negative  Respiratory: Negative  Cardiovascular: Negative  Gastrointestinal: Negative  Endocrine: Negative  Genitourinary: Negative  Musculoskeletal: Negative  Skin: Negative  Allergic/Immunologic: Negative  Neurological: Negative  Hematological: Negative  Psychiatric/Behavioral: Negative          Current Outpatient Medications on File Prior to Visit   Medication Sig   • amphetamine-dextroamphetamine (ADDERALL, 5MG,) 5 MG tablet Take 1 tablet (5 mg total) by mouth daily Max Daily Amount: 5 mg   • escitalopram (Lexapro) 10 mg tablet Take 1 tablet (10 mg total) by mouth daily -Start when bupropion taper is complete   • Cholecalciferol 50 MCG (2000 UT) TABS Take 1 tablet (2,000 Units total) by mouth in the morning (Patient not taking: Reported on 3/2/2023)   • triamcinolone (KENALOG) 0 025 % cream Apply topically if needed for rash (Patient not taking: Reported on 3/2/2023)       Objective     /68 (BP Location: Right arm, Patient Position: Sitting, Cuff Size: Standard)   Pulse 78   Temp 97 5 °F (36 4 °C)   Ht 5' 6" (1 676 m)   Wt 76 2 kg (168 lb)   SpO2 97%   BMI 27 12 kg/m²     Physical Exam  Constitutional:       General: He is not in acute distress  Appearance: Normal appearance  Eyes:      Extraocular Movements: Extraocular movements intact  Conjunctiva/sclera: Conjunctivae normal    Pulmonary:      Effort: Pulmonary effort is normal  No respiratory distress  Breath sounds: No wheezing  Abdominal:      General: Abdomen is flat  Musculoskeletal:         General: Normal range of motion  Cervical back: Normal range of motion  Skin:     Findings: No erythema or rash  Neurological:      Mental Status: He is alert and oriented to person, place, and time     Psychiatric:         Mood and Affect: Mood normal          Behavior: Behavior normal        Joanne Pro, CRNP

## 2023-03-02 ENCOUNTER — CLINICAL SUPPORT (OUTPATIENT)
Dept: FAMILY MEDICINE CLINIC | Facility: CLINIC | Age: 28
End: 2023-03-02

## 2023-03-02 ENCOUNTER — OFFICE VISIT (OUTPATIENT)
Dept: FAMILY MEDICINE CLINIC | Facility: CLINIC | Age: 28
End: 2023-03-02

## 2023-03-02 VITALS
TEMPERATURE: 97.5 F | DIASTOLIC BLOOD PRESSURE: 68 MMHG | OXYGEN SATURATION: 97 % | HEART RATE: 78 BPM | HEIGHT: 66 IN | WEIGHT: 168 LBS | SYSTOLIC BLOOD PRESSURE: 140 MMHG | BODY MASS INDEX: 27 KG/M2

## 2023-03-02 DIAGNOSIS — F90.9 ADULT ADHD: Primary | ICD-10-CM

## 2023-03-02 DIAGNOSIS — F32.A ANXIETY AND DEPRESSION: ICD-10-CM

## 2023-03-02 DIAGNOSIS — Z79.899 CONTROLLED SUBSTANCE AGREEMENT SIGNED: ICD-10-CM

## 2023-03-02 DIAGNOSIS — F41.9 ANXIETY AND DEPRESSION: ICD-10-CM

## 2023-03-02 NOTE — PROGRESS NOTES
Patient came into office for appt with PCP  Patient doing well on medication therapy and declined needing to see clinical pharmacist today  Continue to follow with PCP for care

## 2023-03-02 NOTE — ASSESSMENT & PLAN NOTE
Patient reports a great improvement in his overall ability to focus and complete tasks  Patient is reporting however " my energy level seems to crash at the end of the day"  Patient plans on discussing his concern with clinical pharmacy at their next follow-up  Reviewed controlled substances protocol with the patient, agreement was signed  Drug screen was ordered and collected today

## 2023-03-04 LAB — SPECIMEN SOURCE: NORMAL

## 2023-03-06 LAB
BENZODIAZ UR QL: NEGATIVE NG/ML
BZE UR QL: NEGATIVE NG/ML
CREAT UR-MCNC: 205.3 MG/DL
OPIATES UR QL: NEGATIVE NG/ML
OXIDANTS UR QL: NEGATIVE MCG/ML
OXYCODONE UR QL: NEGATIVE NG/ML
PH UR: 6.1 [PH] (ref 4.5–9)
SPECIMEN SOURCE: NORMAL

## 2023-04-25 DIAGNOSIS — F90.9 ADULT ADHD: ICD-10-CM

## 2023-04-25 RX ORDER — DEXTROAMPHETAMINE SACCHARATE, AMPHETAMINE ASPARTATE, DEXTROAMPHETAMINE SULFATE AND AMPHETAMINE SULFATE 1.25; 1.25; 1.25; 1.25 MG/1; MG/1; MG/1; MG/1
5 TABLET ORAL DAILY
Qty: 30 TABLET | Refills: 0 | Status: SHIPPED | OUTPATIENT
Start: 2023-04-25

## 2023-06-19 DIAGNOSIS — F90.9 ADULT ADHD: ICD-10-CM

## 2023-06-19 RX ORDER — DEXTROAMPHETAMINE SACCHARATE, AMPHETAMINE ASPARTATE, DEXTROAMPHETAMINE SULFATE AND AMPHETAMINE SULFATE 1.25; 1.25; 1.25; 1.25 MG/1; MG/1; MG/1; MG/1
5 TABLET ORAL DAILY
Qty: 30 TABLET | Refills: 0 | Status: SHIPPED | OUTPATIENT
Start: 2023-06-19

## 2023-06-19 NOTE — TELEPHONE ENCOUNTER
Patient's normal pharmacy does not have medication in stock  Will you send to the update [address]  Patient is transferring care from our office as he is no longer part of the trust in the next several weeks  He is getting a new job in [place]  He will have his records transferred

## 2023-08-30 ENCOUNTER — RA CDI HCC (OUTPATIENT)
Dept: OTHER | Facility: HOSPITAL | Age: 28
End: 2023-08-30

## 2023-08-30 NOTE — PROGRESS NOTES
[address] [facility] coding opportunities          Chart Reviewed number of suggestions sent to Provider: 1   f32.a  Can Depression be further classified as per ICD 10 coding guidelines.   Patients Insurance        Commercial Insurance: Chito Navarrete

## 2023-08-31 ENCOUNTER — TELEPHONE (OUTPATIENT)
Dept: FAMILY MEDICINE CLINIC | Facility: CLINIC | Age: 28
End: 2023-08-31

## 2023-10-11 ENCOUNTER — TELEPHONE (OUTPATIENT)
Age: 28
End: 2023-10-11

## 2023-10-11 NOTE — TELEPHONE ENCOUNTER
----- Message from Marina Strauss, [address] [facility] sent at 10/11/2023  7:19 AM EDT -----  Regarding: Overdue for PE  Patient is overdue for a Physical Exam, can please call to schedule this.  Thank you

## 2023-10-13 ENCOUNTER — TELEPHONE (OUTPATIENT)
Dept: FAMILY MEDICINE CLINIC | Facility: CLINIC | Age: 28
End: 2023-10-13

## 2024-04-26 ENCOUNTER — HOSPITAL ENCOUNTER (EMERGENCY)
Facility: HOSPITAL | Age: 29
Discharge: HOME | End: 2024-04-26
Attending: STUDENT IN AN ORGANIZED HEALTH CARE EDUCATION/TRAINING PROGRAM

## 2024-04-26 VITALS
RESPIRATION RATE: 18 BRPM | HEART RATE: 94 BPM | TEMPERATURE: 98.6 F | OXYGEN SATURATION: 99 % | DIASTOLIC BLOOD PRESSURE: 67 MMHG | SYSTOLIC BLOOD PRESSURE: 118 MMHG

## 2024-04-26 DIAGNOSIS — T78.40XA ALLERGIC REACTION, INITIAL ENCOUNTER: Primary | ICD-10-CM

## 2024-04-26 PROCEDURE — 99282 EMERGENCY DEPT VISIT SF MDM: CPT

## 2024-04-26 PROCEDURE — 99284 EMERGENCY DEPT VISIT MOD MDM: CPT | Performed by: STUDENT IN AN ORGANIZED HEALTH CARE EDUCATION/TRAINING PROGRAM

## 2024-04-26 PROCEDURE — 96361 HYDRATE IV INFUSION ADD-ON: CPT

## 2024-04-26 PROCEDURE — 96374 THER/PROPH/DIAG INJ IV PUSH: CPT

## 2024-04-26 PROCEDURE — 96375 TX/PRO/DX INJ NEW DRUG ADDON: CPT

## 2024-04-26 RX ORDER — DIPHENHYDRAMINE HYDROCHLORIDE 50 MG/ML
25 INJECTION INTRAMUSCULAR; INTRAVENOUS ONCE
Status: COMPLETED | OUTPATIENT
Start: 2024-04-26 | End: 2024-04-26

## 2024-04-26 RX ORDER — DIPHENHYDRAMINE HCL 25 MG
25 TABLET ORAL ONCE
Status: DISCONTINUED | OUTPATIENT
Start: 2024-04-26 | End: 2024-04-26

## 2024-04-26 RX ORDER — FAMOTIDINE 20 MG/1
20 TABLET, FILM COATED ORAL ONCE
Status: DISCONTINUED | OUTPATIENT
Start: 2024-04-26 | End: 2024-04-26

## 2024-04-26 RX ORDER — FAMOTIDINE 10 MG/ML
20 INJECTION, SOLUTION INTRAVENOUS ONCE
Status: COMPLETED | OUTPATIENT
Start: 2024-04-26 | End: 2024-04-26

## 2024-04-26 RX ADMIN — SODIUM CHLORIDE 1000 ML: 0.9 INJECTION, SOLUTION INTRAVENOUS at 19:02

## 2024-04-26 RX ADMIN — DIPHENHYDRAMINE HYDROCHLORIDE 25 MG: 50 INJECTION, SOLUTION INTRAMUSCULAR; INTRAVENOUS at 18:57

## 2024-04-26 RX ADMIN — FAMOTIDINE 20 MG: 10 INJECTION, SOLUTION INTRAVENOUS at 18:57

## 2024-04-26 NOTE — ED PROVIDER NOTES
"History  Chief Complaint   Patient presents with    Allergic Reaction     Hives, redness, throat tightness earlier and some issues swallowing earlier; states his throat says it is a bit better now;      HPI this is a pleasant 29-year-old male who presents to the emergency department for concerns of allergic reaction.  Patient states he has a longstanding history of anaphylaxis and not allergies and does utilize EpiPen at home.  Patient states symptoms started yesterday with diffuse Uticaria self resolved.  Patient states he treated that symptom with oral Benadryl as well as Kenalog cream.  Patient denies any new contacts.  Patient states he thought he was doing well however awoke this morning noticed that the rash had returned and he was started to feel a \"itchy \"sensation in the back of his throat.  He was previous history he was concerned that this may be the start of anaphylaxis and hence came here to the emergency department.  En route to the emergency department he did take 25 mg of p.o. Benadryl.  He has not utilized any epinephrine.    Prior to Admission Medications   Prescriptions Last Dose Informant Patient Reported? Taking?   Cholecalciferol 50 MCG (2000 UT) TABS   No No   Sig: Take 1 tablet (2,000 Units total) by mouth in the morning   Patient not taking: Reported on 3/2/2023   amphetamine-dextroamphetamine (ADDERALL, 5MG,) 5 MG tablet   No No   Sig: Take 1 tablet (5 mg total) by mouth daily Max Daily Amount: 5 mg   escitalopram (Lexapro) 10 mg tablet   No No   Sig: Take 1 tablet (10 mg total) by mouth daily -Start when bupropion taper is complete   triamcinolone (KENALOG) 0.025 % cream   No No   Sig: Apply topically if needed for rash   Patient not taking: Reported on 3/2/2023      Facility-Administered Medications: None       Past Medical History:   Diagnosis Date    Allergic     Anxiety 2016    Depression 2016       No past surgical history on file.    Family History   Problem Relation Age of Onset    " Depression Mother     Vision loss Mother     Alcohol abuse Father     Prostate cancer Father     Hearing loss Father     Alcohol abuse Paternal Uncle     Depression Paternal Uncle     Mental illness Cousin         Death by Suicide    Completed Suicide  Cousin     Depression Maternal Aunt      I have reviewed and agree with the history as documented.    E-Cigarette/Vaping     E-Cigarette/Vaping Substances     Social History     Tobacco Use    Smoking status: Passive Smoke Exposure - Never Smoker    Smokeless tobacco: Never   Substance Use Topics    Alcohol use: Yes     Alcohol/week: 8.0 standard drinks of alcohol     Types: 8 Cans of beer per week    Drug use: Never       Review of Systems   Constitutional:  Negative for activity change, appetite change, chills, fatigue and fever.   HENT:  Negative for congestion, dental problem, drooling, ear discharge, ear pain, facial swelling, postnasal drip, rhinorrhea and sinus pain.    Eyes:  Negative for photophobia, pain, discharge and itching.   Respiratory:  Negative for apnea, cough, chest tightness and shortness of breath.    Cardiovascular:  Negative for chest pain and leg swelling.   Gastrointestinal:  Negative for abdominal distention, abdominal pain, anal bleeding, constipation, diarrhea and nausea.   Endocrine: Negative for cold intolerance, heat intolerance and polydipsia.   Genitourinary:  Negative for difficulty urinating.   Musculoskeletal:  Negative for arthralgias, gait problem, joint swelling and myalgias.   Skin:  Positive for rash. Negative for color change and pallor.   Allergic/Immunologic: Negative for immunocompromised state.   Neurological:  Negative for dizziness, seizures, facial asymmetry, weakness, light-headedness, numbness and headaches.   Psychiatric/Behavioral:  Negative for agitation, behavioral problems, confusion, decreased concentration and dysphoric mood.    All other systems reviewed and are negative.      Physical Exam  Physical  Exam  Constitutional:       Appearance: He is well-developed.   HENT:      Head: Normocephalic.      Comments: Facial flushing    No obvious oropharyngeal swelling.  Eyes:      Pupils: Pupils are equal, round, and reactive to light.   Cardiovascular:      Rate and Rhythm: Normal rate and regular rhythm.   Pulmonary:      Effort: Pulmonary effort is normal.      Breath sounds: Normal breath sounds.   Abdominal:      General: Bowel sounds are normal.      Palpations: Abdomen is soft.   Musculoskeletal:         General: Normal range of motion.      Cervical back: Normal range of motion and neck supple.   Skin:     General: Skin is warm.      Comments: No obvious Uticaria, wheals or other skin rashes         Vital Signs  ED Triage Vitals   Temperature Pulse Respirations Blood Pressure SpO2   04/26/24 1936 04/26/24 1850 04/26/24 1850 04/26/24 1850 04/26/24 1850   98.6 °F (37 °C) 101 19 (!) 163/112 99 %      Temp src Heart Rate Source Patient Position - Orthostatic VS BP Location FiO2 (%)   -- 04/26/24 1850 -- 04/26/24 1850 --    Monitor  Left arm       Pain Score       04/26/24 1850       No Pain           Vitals:    04/26/24 1850 04/26/24 1928 04/26/24 2000   BP: (!) 163/112  118/67   Pulse: 101 91 94         Visual Acuity      ED Medications  Medications   sodium chloride 0.9 % bolus 1,000 mL (1,000 mL Intravenous New Bag 4/26/24 1902)   diphenhydrAMINE (BENADRYL) injection 25 mg (25 mg Intravenous Given 4/26/24 1857)   Famotidine (PF) (PEPCID) injection 20 mg (20 mg Intravenous Given 4/26/24 1857)       Diagnostic Studies  Results Reviewed       None                   No orders to display              Procedures  Procedures         ED Course  ED Course as of 04/26/24 2020 Fri Apr 26, 2024 1938 Patient reevaluated resting comfortably no new rashes no new symptoms.  Overall well-appearing.  Pulses improved with medications and fluid.  Remains hemodynamically stable.                                              Medical Decision Making  29-year-old male presents with allergic reaction type symptoms.  Symptoms started approximately 24 hours ago consistent with transient uticaria    Physical exam is relatively reassuring there is some mild flushing of the face but no obvious wheals or other rashes noted throughout the body.  The lung fields are clear and there is no obvious swelling or edema in the nasopharyngeal area.    Physical exam consistent with mild allergic reaction with unclear trigger.  Patient has been self treating at home with Benadryl as well as topical steroid with good symptomatic improvement.  Plan provide additional dose of Benadryl here monitor for short interval.  Provide fluid and discharged home.  Patient does have epinephrine autoinjector at home and does have routine follow-up with PCP scheduled this upcoming week.  Strict return precautions discussed.    Problems Addressed:  Allergic reaction, initial encounter: acute illness or injury    Amount and/or Complexity of Data Reviewed  Labs:  Decision-making details documented in ED Course.  Radiology:  Decision-making details documented in ED Course.  ECG/medicine tests:  Decision-making details documented in ED Course.    Risk  Prescription drug management.             Disposition  Final diagnoses:   Allergic reaction, initial encounter     Time reflects when diagnosis was documented in both MDM as applicable and the Disposition within this note       Time User Action Codes Description Comment    4/26/2024  6:58 PM Domenico Cline Add [T78.40XA] Allergic reaction, initial encounter           ED Disposition       ED Disposition   Discharge    Condition   Stable    Date/Time   Fri Apr 26, 2024  8:13 PM    Comment   Zac Castorena discharge to home/self care.                   Follow-up Information    None         Patient's Medications   Discharge Prescriptions    No medications on file       No discharge procedures  on file.    PDMP Review         Value Time User    PDMP Reviewed  Yes 6/19/2023  4:13 PM JOAN Roldan            ED Provider  Electronically Signed by             Domenico Cline MD  04/26/24 2020

## 2024-04-29 ENCOUNTER — TELEPHONE (OUTPATIENT)
Dept: FAMILY MEDICINE CLINIC | Facility: CLINIC | Age: 29
End: 2024-04-29

## 2024-04-30 NOTE — TELEPHONE ENCOUNTER
I left voicemail for PT to return call for him to confirm whether or not he wants to stay in our care or if he already has a PCP

## 2025-08-07 ENCOUNTER — TELEPHONE (OUTPATIENT)
Age: 30
End: 2025-08-07

## 2025-08-08 ENCOUNTER — DOCUMENTATION (OUTPATIENT)
Dept: ADMINISTRATIVE | Facility: OTHER | Age: 30
End: 2025-08-08